# Patient Record
Sex: MALE | Race: WHITE | NOT HISPANIC OR LATINO | Employment: FULL TIME | URBAN - METROPOLITAN AREA
[De-identification: names, ages, dates, MRNs, and addresses within clinical notes are randomized per-mention and may not be internally consistent; named-entity substitution may affect disease eponyms.]

---

## 2023-11-21 ENCOUNTER — TELEPHONE (OUTPATIENT)
Dept: GASTROENTEROLOGY | Facility: CLINIC | Age: 53
End: 2023-11-21
Payer: COMMERCIAL

## 2023-11-21 ENCOUNTER — TRANSCRIBE ORDERS (OUTPATIENT)
Dept: OTHER | Age: 53
End: 2023-11-21

## 2023-11-21 DIAGNOSIS — K86.89 PERIPANCREATIC FLUID COLLECTION: Primary | ICD-10-CM

## 2023-11-21 NOTE — TELEPHONE ENCOUNTER
Advanced Endoscopy     Referring provider: Konrad Tan PA-C   [facility] [place] [address]  Ph: [phone] Fx: [phone]    Referred to: [facility]     Provider Requested: NA     Referral Received: 11/21/23     Records received: Care Everywhere      Images received: PACS    Insurance Coverage: [facility]    Evaluation for: K86.89 (ICD-10-CM) - Peripancreatic fluid collection     Clinical History (per RN review):     Patient hospitalized for a week with necrotizing  pancreatitis in September 2023; followed by GI outpatient for peripancreatic fluid collection. Experienced several weeks of night sweats following hospitalization. Sudden recurrence of pain on 11/19/23 resulted in ED visit. Patient was discharged on narcotic pain medication pending referral here.     Discussed referral with patient on 11/27/23 who reports that he is off pain medication at this time.  Eating and drinking okay. Denies fevers but does report intermittent night sweats. Advised to report to [facility] ED in the event of recurrence of pain or fevers if it's safe for him to do so.     ED visit 11/19/23:  HPI  53-year-old man presents to the emergency department ambulatory for evaluation of recurrent sudden onset epigastric pain. States this came on shortly after he ate some leftover stir newman this morning. Notes the pain is fairly constant but intermittently worsens, seems to occur about every 3 minutes by his best estimation. Radiates slightly to the back. No vomiting but did have some nausea with this. Has had a bowel movement this morning, no blood or melena. No fevers or chills, no urinary symptoms. No prior history of abdominal surgery or known history of ulcers. Does have recent history of pancreatitis in the head and uncinate process, has had interval imaging in his being followed for development of pseudocyst. No abdominal trauma.     EXAM: CT ABD PELVIS  W IV CONT   LOCATION: [facility]   DATE: 11/19/2023   INDICATION: Sudden onset epigastric pain. History of pancreatitis.   COMPARISON: MRCP 10/06/2023.   IMPRESSION:   1.  Mild acute interstitial edematous pancreatitis predominantly involving the pancreatic head. No definite evidence of pancreatic parenchymal necrosis.   2.  Loculated, mildly thick-walled peripancreatic fluid collection centered within the pancreatic head and neck extending into the central mesentery has slightly increased in size since 10/06/2023, now measuring up to 13.2 cm. Superimposed infection is not excluded.   3.  No new peripancreatic fluid collections.   4.  No calcified gallstones or biliary ductal dilation.     EXAM: MR MRCP WO IV CONT   LOCATION: [facility]   DATE: 10/6/2023   INDICATION: Pancreatitis, necrotizing; necrotizing pancreatitis, new bile duct dilation, Acute pancreatitis with uninfected necro, Other specified diseases of biliary tract  IMPRESSION:   1. Enlargement of the peripancreatic fluid collection which results in mild dilatation of the central intrahepatic bile ducts with tapering at the pancreatic head. The collection now measures 12 x 6 x 12 cm. No choledocholithiasis.   2.  The main pancreatic duct is normal in caliber.   3.  No gallstones or visible gallbladder sludge.     GASTROENTEROLOGY CLINIC NOTE 10/3/23  Chief complaint: Necrotizing pancreatitis  HPI: Danny Lofton is a 53 y.o. male with a pertinent past medical history including:HTN, Hyperlipidemia, Acute pancreatitis    He was admitted to [facility] last month for acute pancreatitis. During admission he developed a fever and had imaging showing necrotizing pancreatitis along with possible pneumonia. He was started on meropenem with improvement and switched to Levaquin/Flagyl at discharge and has now completed antibiotics.    He denies any prior history of acute pancreatitis and no history of heavy drinking. He had been started on  lisinopril-HCTZ several months prior to his admission, and that has now been discontinued due to concern that it was the trigger for his pancreatitis. He had an abdominal US showing no cholelithiasis, but a repeat ultrasound did show gallbladder sludge and new CBD dilation. Triglycerides are elevated but not to the degree typically associated with acute pancreatitis.    He continues to have some achy pain in his upper abdomen that is worse with pressure. He has been taking ibuprofen 400mg BID to help - especially at night so that he can get comfortable. Also has some pain on his sides bilaterally. He has been experiencing early satiety since discharge but denies abdominal pain with eating. Has lost about 20lb since he initially presented to the ER, and he notes he has been having drenching night sweats every night now since discharge to the point where he has to go towel off in the bathroom.    He has also been experiencing persistent bloating. He was very constipated during hospitalization, but that has improved and he is now having around 4 bowel movements per day of varied form; states that this is not unusual for him. Denies steatorrhea and no blood in stools. Has never had a colonoscopy but does have a FIT test that was ordered by his PCP he is planning to do.  Recent acute pancreatitis with necrosis s/p antibiotics with improvement in pain, but persistent early satiety, bloating, and discomfort. Recent US with increase in size of pancreatic head fluid collection and new CBD dilation; will order MRCP for further evaluation. Discussed possible ERCP and/or necrosectomy based on results. May also benefit from cholecystectomy in the future due to presence of gallbladder sludge on US. Will also repeat CBC, LFT, and CRP.     EXAM: CT ABD PELVIS W IV CONT   LOCATION: [facility]   DATE: 9/17/2023   INDICATION: Sepsis; fever with pancreatitis.  r/o pancreatic necrosis   IMPRESSION:   1.  Progressive severe  acute interstitial edematous pancreatitis. Interval development of necrosis and an acute necrotic fluid collection within the head and uncinate process. No CT evidence for an infected fluid collection.   2.  Duodenitis and cholangitis of the distal common bile duct secondary to the adjacent pancreatitis.   3.  New trace bilateral pleural effusions with adjacent lower lobar consolidation and airspace opacities suspicious for developing pneumonia, perhaps secondary to aspiration.     [facility]  MD Decision for clinic consultation/Orders:        I will recommend EUS guided cystduodenostomy under fluroscopy for endoscopic drainage. Since he is having night sweats, this should be done urgently. I will recommend 11/29 if possible.         Referral updates/Patient contacted: 11/28/23

## 2023-11-22 ENCOUNTER — DOCUMENTATION ONLY (OUTPATIENT)
Dept: GASTROENTEROLOGY | Facility: CLINIC | Age: 53
End: 2023-11-22
Payer: COMMERCIAL

## 2023-11-22 NOTE — PROGRESS NOTES
Called to request images be pushed to Dr. TATTOFF PACS.    Images Requested:  -- CT Abd Pelvis W IV Cont (11/19/2023 11:37 AM CST)  -- XR Chest 2 Views (10/23/2023 10:32 AM CDT)  -- MR MRCP WO IV Cont (10/06/2023 2:01 PM CDT)  -- US Abd Complete (09/22/2023 1:23 PM CDT)  -- CT Abd Pelvis W IV Cont (09/17/2023 2:39 PM CDT)  -- US Abd RUQ Organs (09/15/2023 9:28 AM CDT)  -- CT Angio Chest Abd Pel W/WO IV Cont Dissection (09/14/2023 6:29 PM CDT)    Facility Information:   Emergency Department   [address] [place], [place] [id number]   Phone #: [phone] sk

## 2023-11-28 ENCOUNTER — PREP FOR PROCEDURE (OUTPATIENT)
Dept: GASTROENTEROLOGY | Facility: CLINIC | Age: 53
End: 2023-11-28
Payer: COMMERCIAL

## 2023-11-28 ENCOUNTER — PATIENT OUTREACH (OUTPATIENT)
Dept: GASTROENTEROLOGY | Facility: CLINIC | Age: 53
End: 2023-11-28
Payer: COMMERCIAL

## 2023-11-28 DIAGNOSIS — K86.89 PERIPANCREATIC FLUID COLLECTION: Primary | ICD-10-CM

## 2023-11-28 NOTE — PROGRESS NOTES
Following review of referral, per Dr. Christianson:     I will recommend EUS guided cystduodenostomy under fluroscopy for endoscopic drainage. Since he is having night sweats, this should be done urgently. I will recommend 11/29 if possible     Please assist in scheduling:     Procedure/Imaging/Clinic: EUS guided cystoduodenostomy under fluoroscopy for endoscopic drainage.  Physician: Sammy  Timing: Next available  Scope time: Provider average  Anesthesia: General  Dx: Peripancreatic fluid collection  Tier:2  Location: [facility]  Patient communication letter header: EUS (Endoscopic Ultrasound)     Offered patient 11/29/23 and scheduled. Unfortunately, he does not have someone available to come with him to [place] on such short notice. Will reschedule for next available date 12/6/23.     Explained they will need a , someone to stay with them for 24 hours and should stay in town for 24 hours (within 45 min of Hospital) post procedure.     Patient will need a pre-op physical within 30 days of procedure. If outside [facility] system, will need physical faxed to number [phone]   If you do not get a preop physical, your procedure could be cancelled, patient voiced understanding*    Preop Plan: Patient will arrange with PCP office. Records available in Care Everywhere.     Does patient have any history of gastric bypass/gastric surgery/altered panc/bili anatomy? No    Does patient have [employer] insurance? No    Med Review    Blood thinner -  None  ASA - None  Diabetic - None   Injectable or oral medications for weight loss - None    Patient Education r/t procedure: Discussion; MyChart activation sent to e-mail. Patient confirmed receipt and will register.     A pre-op nurse will call 1-2 days prior to the procedure.    NPO/Prep: No solid food 8 hours prior to arrival at the hospital. Clear liquids okay until one hour prior to arrival.     Other specific details/comments: None    Advised to contact clinic in  the event of Covid symptoms or known exposure within 14 days of procedure: Discussed.     Verbalized understanding of all instructions. All questions answered. Clinic contact and scheduling numbers verified for future questions/concerns. Message routed to OR scheduling.     Brianda Miller RN, BSN  Care Coordinator  Advanced Endoscopy

## 2023-11-28 NOTE — PROGRESS NOTES
Procedure/Imaging/Clinic: EUS guided cystoduodenostomy under fluoroscopy for endoscopic drainage.  Physician: Sammy  Timing: Next available  Scope time: Provider average  Anesthesia: General  Dx: Peripancreatic fluid collection  Tier:2  Location: [facility]  Patient communication letter header:EUS (Endoscopic Ultrasound)

## 2023-12-04 RX ORDER — ATORVASTATIN CALCIUM 10 MG/1
10 TABLET, FILM COATED ORAL DAILY
COMMUNITY
Start: 2023-01-31 | End: 2024-06-04

## 2023-12-04 RX ORDER — AMLODIPINE BESYLATE 10 MG/1
10 TABLET ORAL DAILY
COMMUNITY
Start: 2023-03-14 | End: 2024-06-04

## 2023-12-06 ENCOUNTER — ANESTHESIA EVENT (OUTPATIENT)
Dept: SURGERY | Facility: CLINIC | Age: 53
End: 2023-12-06
Payer: COMMERCIAL

## 2023-12-06 ENCOUNTER — APPOINTMENT (OUTPATIENT)
Dept: GENERAL RADIOLOGY | Facility: CLINIC | Age: 53
End: 2023-12-06
Attending: INTERNAL MEDICINE
Payer: COMMERCIAL

## 2023-12-06 ENCOUNTER — HOSPITAL ENCOUNTER (OUTPATIENT)
Facility: CLINIC | Age: 53
Discharge: HOME | End: 2023-12-06
Attending: INTERNAL MEDICINE | Admitting: INTERNAL MEDICINE
Payer: COMMERCIAL

## 2023-12-06 ENCOUNTER — ANESTHESIA (OUTPATIENT)
Dept: SURGERY | Facility: CLINIC | Age: 53
End: 2023-12-06
Payer: COMMERCIAL

## 2023-12-06 VITALS
BODY MASS INDEX: 29.56 KG/M2 | DIASTOLIC BLOOD PRESSURE: 102 MMHG | RESPIRATION RATE: 16 BRPM | WEIGHT: 218.26 LBS | TEMPERATURE: 98 F | OXYGEN SATURATION: 95 % | HEIGHT: 72 IN | HEART RATE: 78 BPM | SYSTOLIC BLOOD PRESSURE: 146 MMHG

## 2023-12-06 DIAGNOSIS — K85.92 ACUTE PANCREATITIS WITH INFECTED NECROSIS, UNSPECIFIED PANCREATITIS TYPE: Primary | ICD-10-CM

## 2023-12-06 LAB — UPPER EUS: NORMAL

## 2023-12-06 PROCEDURE — 370N000017 HC ANESTHESIA TECHNICAL FEE, PER MIN: Performed by: INTERNAL MEDICINE

## 2023-12-06 PROCEDURE — 250N000011 HC RX IP 250 OP 636: Mod: JZ | Performed by: ANESTHESIOLOGY

## 2023-12-06 PROCEDURE — C1726 CATH, BAL DIL, NON-VASCULAR: HCPCS | Performed by: INTERNAL MEDICINE

## 2023-12-06 PROCEDURE — 999N000179 XR SURGERY CARM FLUORO LESS THAN 5 MIN W STILLS: Mod: TC

## 2023-12-06 PROCEDURE — 258N000003 HC RX IP 258 OP 636: Performed by: ANESTHESIOLOGY

## 2023-12-06 PROCEDURE — 255N000002 HC RX 255 OP 636: Mod: JZ | Performed by: INTERNAL MEDICINE

## 2023-12-06 PROCEDURE — 999N000141 HC STATISTIC PRE-PROCEDURE NURSING ASSESSMENT: Performed by: INTERNAL MEDICINE

## 2023-12-06 PROCEDURE — 710N000010 HC RECOVERY PHASE 1, LEVEL 2, PER MIN: Performed by: INTERNAL MEDICINE

## 2023-12-06 PROCEDURE — 710N000012 HC RECOVERY PHASE 2, PER MINUTE: Performed by: INTERNAL MEDICINE

## 2023-12-06 PROCEDURE — C1769 GUIDE WIRE: HCPCS | Performed by: INTERNAL MEDICINE

## 2023-12-06 PROCEDURE — 250N000012 HC RX MED GY IP 250 OP 636 PS 637: Performed by: ANESTHESIOLOGY

## 2023-12-06 PROCEDURE — 250N000025 HC SEVOFLURANE, PER MIN: Performed by: INTERNAL MEDICINE

## 2023-12-06 PROCEDURE — 360N000082 HC SURGERY LEVEL 2 W/ FLUORO, PER MIN: Performed by: INTERNAL MEDICINE

## 2023-12-06 PROCEDURE — 272N000001 HC OR GENERAL SUPPLY STERILE: Performed by: INTERNAL MEDICINE

## 2023-12-06 PROCEDURE — C1874 STENT, COATED/COV W/DEL SYS: HCPCS | Performed by: INTERNAL MEDICINE

## 2023-12-06 PROCEDURE — 250N000009 HC RX 250: Performed by: ANESTHESIOLOGY

## 2023-12-06 PROCEDURE — C2625 STENT, NON-COR, TEM W/DEL SY: HCPCS | Performed by: INTERNAL MEDICINE

## 2023-12-06 DEVICE — STENT SOLUS BILIARY 10FRX03CM DBL PIGTAIL W/INTRO G25670
Type: IMPLANTABLE DEVICE | Site: DUODENUM | Status: NON-FUNCTIONAL
Removed: 2023-12-20

## 2023-12-06 DEVICE — STENT AND ELECTROCAUTERY - ENHANCED DELIVERY SYSTEM
Type: IMPLANTABLE DEVICE | Site: DUODENUM | Status: NON-FUNCTIONAL
Brand: AXIOS™
Removed: 2023-12-20

## 2023-12-06 RX ORDER — ONDANSETRON 2 MG/ML
4 INJECTION INTRAMUSCULAR; INTRAVENOUS EVERY 30 MIN PRN
Status: DISCONTINUED | OUTPATIENT
Start: 2023-12-06 | End: 2023-12-06 | Stop reason: HOSPADM

## 2023-12-06 RX ORDER — SODIUM CHLORIDE, SODIUM LACTATE, POTASSIUM CHLORIDE, CALCIUM CHLORIDE 600; 310; 30; 20 MG/100ML; MG/100ML; MG/100ML; MG/100ML
INJECTION, SOLUTION INTRAVENOUS CONTINUOUS PRN
Status: DISCONTINUED | OUTPATIENT
Start: 2023-12-06 | End: 2023-12-06

## 2023-12-06 RX ORDER — SODIUM CHLORIDE, SODIUM LACTATE, POTASSIUM CHLORIDE, CALCIUM CHLORIDE 600; 310; 30; 20 MG/100ML; MG/100ML; MG/100ML; MG/100ML
INJECTION, SOLUTION INTRAVENOUS CONTINUOUS
Status: DISCONTINUED | OUTPATIENT
Start: 2023-12-06 | End: 2023-12-06 | Stop reason: HOSPADM

## 2023-12-06 RX ORDER — HYDROMORPHONE HCL IN WATER/PF 6 MG/30 ML
0.4 PATIENT CONTROLLED ANALGESIA SYRINGE INTRAVENOUS EVERY 5 MIN PRN
Status: DISCONTINUED | OUTPATIENT
Start: 2023-12-06 | End: 2023-12-06 | Stop reason: HOSPADM

## 2023-12-06 RX ORDER — APREPITANT 40 MG/1
40 CAPSULE ORAL ONCE
Status: COMPLETED | OUTPATIENT
Start: 2023-12-06 | End: 2023-12-06

## 2023-12-06 RX ORDER — NALOXONE HYDROCHLORIDE 0.4 MG/ML
0.2 INJECTION, SOLUTION INTRAMUSCULAR; INTRAVENOUS; SUBCUTANEOUS
Status: DISCONTINUED | OUTPATIENT
Start: 2023-12-06 | End: 2023-12-06 | Stop reason: HOSPADM

## 2023-12-06 RX ORDER — FLUMAZENIL 0.1 MG/ML
0.2 INJECTION, SOLUTION INTRAVENOUS
Status: DISCONTINUED | OUTPATIENT
Start: 2023-12-06 | End: 2023-12-06 | Stop reason: HOSPADM

## 2023-12-06 RX ORDER — PROPOFOL 10 MG/ML
INJECTION, EMULSION INTRAVENOUS PRN
Status: DISCONTINUED | OUTPATIENT
Start: 2023-12-06 | End: 2023-12-06

## 2023-12-06 RX ORDER — HYDROMORPHONE HCL IN WATER/PF 6 MG/30 ML
0.2 PATIENT CONTROLLED ANALGESIA SYRINGE INTRAVENOUS EVERY 5 MIN PRN
Status: DISCONTINUED | OUTPATIENT
Start: 2023-12-06 | End: 2023-12-06 | Stop reason: HOSPADM

## 2023-12-06 RX ORDER — LIDOCAINE 40 MG/G
CREAM TOPICAL
Status: DISCONTINUED | OUTPATIENT
Start: 2023-12-06 | End: 2023-12-06 | Stop reason: HOSPADM

## 2023-12-06 RX ORDER — LEVOFLOXACIN 500 MG/1
500 TABLET, FILM COATED ORAL DAILY
Qty: 7 TABLET | Refills: 0 | Status: SHIPPED | OUTPATIENT
Start: 2023-12-06

## 2023-12-06 RX ORDER — IOPAMIDOL 510 MG/ML
INJECTION, SOLUTION INTRAVASCULAR PRN
Status: DISCONTINUED | OUTPATIENT
Start: 2023-12-06 | End: 2023-12-06 | Stop reason: HOSPADM

## 2023-12-06 RX ORDER — FENTANYL CITRATE 50 UG/ML
25 INJECTION, SOLUTION INTRAMUSCULAR; INTRAVENOUS EVERY 5 MIN PRN
Status: DISCONTINUED | OUTPATIENT
Start: 2023-12-06 | End: 2023-12-06 | Stop reason: HOSPADM

## 2023-12-06 RX ORDER — DEXAMETHASONE SODIUM PHOSPHATE 4 MG/ML
INJECTION, SOLUTION INTRA-ARTICULAR; INTRALESIONAL; INTRAMUSCULAR; INTRAVENOUS; SOFT TISSUE PRN
Status: DISCONTINUED | OUTPATIENT
Start: 2023-12-06 | End: 2023-12-06

## 2023-12-06 RX ORDER — ONDANSETRON 2 MG/ML
4 INJECTION INTRAMUSCULAR; INTRAVENOUS EVERY 6 HOURS PRN
Status: DISCONTINUED | OUTPATIENT
Start: 2023-12-06 | End: 2023-12-06 | Stop reason: HOSPADM

## 2023-12-06 RX ORDER — OXYCODONE HYDROCHLORIDE 10 MG/1
10 TABLET ORAL
Status: DISCONTINUED | OUTPATIENT
Start: 2023-12-06 | End: 2023-12-06 | Stop reason: HOSPADM

## 2023-12-06 RX ORDER — ONDANSETRON 4 MG/1
4 TABLET, ORALLY DISINTEGRATING ORAL EVERY 30 MIN PRN
Status: DISCONTINUED | OUTPATIENT
Start: 2023-12-06 | End: 2023-12-06 | Stop reason: HOSPADM

## 2023-12-06 RX ORDER — LEVOFLOXACIN 5 MG/ML
INJECTION, SOLUTION INTRAVENOUS PRN
Status: DISCONTINUED | OUTPATIENT
Start: 2023-12-06 | End: 2023-12-06

## 2023-12-06 RX ORDER — OXYCODONE HYDROCHLORIDE 5 MG/1
5 TABLET ORAL
Status: DISCONTINUED | OUTPATIENT
Start: 2023-12-06 | End: 2023-12-06 | Stop reason: HOSPADM

## 2023-12-06 RX ORDER — FENTANYL CITRATE 50 UG/ML
50 INJECTION, SOLUTION INTRAMUSCULAR; INTRAVENOUS EVERY 5 MIN PRN
Status: DISCONTINUED | OUTPATIENT
Start: 2023-12-06 | End: 2023-12-06 | Stop reason: HOSPADM

## 2023-12-06 RX ORDER — NALOXONE HYDROCHLORIDE 0.4 MG/ML
0.4 INJECTION, SOLUTION INTRAMUSCULAR; INTRAVENOUS; SUBCUTANEOUS
Status: DISCONTINUED | OUTPATIENT
Start: 2023-12-06 | End: 2023-12-06 | Stop reason: HOSPADM

## 2023-12-06 RX ORDER — HYDROCODONE BITARTRATE AND ACETAMINOPHEN 5; 325 MG/1; MG/1
1-2 TABLET ORAL EVERY 6 HOURS PRN
COMMUNITY
Start: 2023-11-19

## 2023-12-06 RX ORDER — LIDOCAINE HYDROCHLORIDE 20 MG/ML
INJECTION, SOLUTION INFILTRATION; PERINEURAL PRN
Status: DISCONTINUED | OUTPATIENT
Start: 2023-12-06 | End: 2023-12-06

## 2023-12-06 RX ORDER — ONDANSETRON 4 MG/1
4 TABLET, ORALLY DISINTEGRATING ORAL EVERY 6 HOURS PRN
Status: DISCONTINUED | OUTPATIENT
Start: 2023-12-06 | End: 2023-12-06 | Stop reason: HOSPADM

## 2023-12-06 RX ORDER — ONDANSETRON 2 MG/ML
INJECTION INTRAMUSCULAR; INTRAVENOUS PRN
Status: DISCONTINUED | OUTPATIENT
Start: 2023-12-06 | End: 2023-12-06

## 2023-12-06 RX ORDER — FENTANYL CITRATE 50 UG/ML
INJECTION, SOLUTION INTRAMUSCULAR; INTRAVENOUS PRN
Status: DISCONTINUED | OUTPATIENT
Start: 2023-12-06 | End: 2023-12-06

## 2023-12-06 RX ADMIN — DEXMEDETOMIDINE HYDROCHLORIDE 8 MCG: 100 INJECTION, SOLUTION INTRAVENOUS at 16:18

## 2023-12-06 RX ADMIN — APREPITANT 40 MG: 40 CAPSULE ORAL at 15:35

## 2023-12-06 RX ADMIN — LIDOCAINE HYDROCHLORIDE 100 MG: 20 INJECTION, SOLUTION INFILTRATION; PERINEURAL at 15:45

## 2023-12-06 RX ADMIN — PROPOFOL 150 MG: 10 INJECTION, EMULSION INTRAVENOUS at 15:45

## 2023-12-06 RX ADMIN — SODIUM CHLORIDE, POTASSIUM CHLORIDE, SODIUM LACTATE AND CALCIUM CHLORIDE: 600; 310; 30; 20 INJECTION, SOLUTION INTRAVENOUS at 15:40

## 2023-12-06 RX ADMIN — ONDANSETRON 4 MG: 2 INJECTION INTRAMUSCULAR; INTRAVENOUS at 16:04

## 2023-12-06 RX ADMIN — PROPOFOL 50 MG: 10 INJECTION, EMULSION INTRAVENOUS at 15:47

## 2023-12-06 RX ADMIN — PHENYLEPHRINE HYDROCHLORIDE 50 MCG: 10 INJECTION INTRAVENOUS at 16:29

## 2023-12-06 RX ADMIN — PHENYLEPHRINE HYDROCHLORIDE 50 MCG: 10 INJECTION INTRAVENOUS at 16:41

## 2023-12-06 RX ADMIN — SUGAMMADEX 200 MG: 100 INJECTION, SOLUTION INTRAVENOUS at 16:52

## 2023-12-06 RX ADMIN — Medication 70 MG: at 15:45

## 2023-12-06 RX ADMIN — FENTANYL CITRATE 50 MCG: 50 INJECTION INTRAMUSCULAR; INTRAVENOUS at 15:45

## 2023-12-06 RX ADMIN — DEXAMETHASONE SODIUM PHOSPHATE 4 MG: 4 INJECTION, SOLUTION INTRA-ARTICULAR; INTRALESIONAL; INTRAMUSCULAR; INTRAVENOUS; SOFT TISSUE at 16:04

## 2023-12-06 RX ADMIN — LEVOFLOXACIN 500 MG: 5 INJECTION, SOLUTION INTRAVENOUS at 15:45

## 2023-12-06 RX ADMIN — FENTANYL CITRATE 50 MCG: 50 INJECTION INTRAMUSCULAR; INTRAVENOUS at 16:19

## 2023-12-06 RX ADMIN — MIDAZOLAM 2 MG: 1 INJECTION INTRAMUSCULAR; INTRAVENOUS at 15:36

## 2023-12-06 ASSESSMENT — ACTIVITIES OF DAILY LIVING (ADL)
ADLS_ACUITY_SCORE: 33
ADLS_ACUITY_SCORE: 35

## 2023-12-06 NOTE — OR NURSING
Dr. Parikh notified that patient has a history of PONV and there aren't any preop medications in for nausea. MD to put in orders.

## 2023-12-06 NOTE — ANESTHESIA PREPROCEDURE EVALUATION
"Anesthesia Pre-Procedure Evaluation    Patient: Danny Lofton   MRN: [id number] : 1970        Procedure : Procedure(s):  ENDOSCOPIC ULTRASOUND guided cystoduodenostomy under fluoroscopy for endoscopic drainage          Past Medical History:   Diagnosis Date     PONV (postoperative nausea and vomiting)       History reviewed. No pertinent surgical history.   No Known Allergies   Social History     Tobacco Use     Smoking status: Former     Types: Cigarettes     Quit date: 1997     Years since quittin.0     Smokeless tobacco: Never   Substance Use Topics     Alcohol use: Not Currently     Comment: none  since september      Wt Readings from Last 1 Encounters:   23 99 kg (218 lb 4.1 oz)        Anesthesia Evaluation   Pt has had prior anesthetic. Type: General.    History of anesthetic complications  - PONV.  emergence delirium.    ROS/MED HX  ENT/Pulmonary:       Neurologic:       Cardiovascular:       METS/Exercise Tolerance: >4 METS    Hematologic:       Musculoskeletal:       GI/Hepatic:    (-) GERD   Renal/Genitourinary:       Endo:       Psychiatric/Substance Use:       Infectious Disease:       Malignancy:       Other:            Physical Exam    Airway        Mallampati: II   TM distance: > 3 FB   Neck ROM: full   Mouth opening: > 3 cm    Respiratory Devices and Support         Dental       (+) Modest Abnormalities - crowns, retainers, 1 or 2 missing teeth      Cardiovascular             Pulmonary               OUTSIDE LABS:  CBC: No results found for: \"WBC\", \"HGB\", \"HCT\", \"PLT\"  BMP: No results found for: \"NA\", \"POTASSIUM\", \"CHLORIDE\", \"CO2\", \"BUN\", \"CR\", \"GLC\"  COAGS: No results found for: \"PTT\", \"INR\", \"FIBR\"  POC: No results found for: \"BGM\", \"HCG\", \"HCGS\"  HEPATIC: No results found for: \"ALBUMIN\", \"PROTTOTAL\", \"ALT\", \"AST\", \"GGT\", \"ALKPHOS\", \"BILITOTAL\", \"BILIDIRECT\", \"DAVID\"  OTHER: No results found for: \"PH\", \"LACT\", \"A1C\", \"RENATA\", \"PHOS\", \"MAG\", \"LIPASE\", \"AMYLASE\", \"TSH\", " "\"T4\", \"T3\", \"CRP\", \"SED\"    Anesthesia Plan    ASA Status:  3    NPO Status:  NPO Appropriate    Anesthesia Type: General.     - Airway: ETT   Induction: Intravenous.   Maintenance: Balanced.        Consents    Anesthesia Plan(s) and associated risks, benefits, and realistic alternatives discussed. Questions answered and patient/representative(s) expressed understanding.     - Discussed:     - Discussed with:  Patient      - Extended Intubation/Ventilatory Support Discussed: No.      - Patient is DNR/DNI Status: No     Use of blood products discussed: No .     Postoperative Care    Pain management: IV analgesics.   PONV prophylaxis: Ondansetron (or other 5HT-3), Dexamethasone or Solumedrol     Comments:               Arlette Parikh MD    I have reviewed the pertinent notes and labs in the chart from the past 30 days and (re)examined the patient.  Any updates or changes from those notes are reflected in this note.              # Overweight: Estimated body mass index is 29.6 kg/m  as calculated from the following:    Height as of this encounter: 1.829 m (6').    Weight as of this encounter: 99 kg (218 lb 4.1 oz).      "

## 2023-12-06 NOTE — ANESTHESIA CARE TRANSFER NOTE
Patient: Danny Lofton    Procedure: Procedure(s):  ENDOSCOPIC ULTRASOUND guided cystoduodenostomy under fluoroscopy for endoscopic drainage. Dilation and stent placement       Diagnosis: Peripancreatic fluid collection [K86.89]  Diagnosis Additional Information: No value filed.    Anesthesia Type:   General     Note:    Oropharynx: oropharynx clear of all foreign objects and spontaneously breathing  Level of Consciousness: awake  Oxygen Supplementation: face mask    Independent Airway: airway patency satisfactory and stable  Dentition: dentition unchanged  Vital Signs Stable: post-procedure vital signs reviewed and stable  Report to RN Given: handoff report given  Patient transferred to: PACU    Handoff Report: Identifed the Patient, Identified the Reponsible Provider, Reviewed the pertinent medical history, Discussed the surgical course, Reviewed Intra-OP anesthesia mangement and issues during anesthesia, Set expectations for post-procedure period and Allowed opportunity for questions and acknowledgement of understanding      Vitals:  Vitals Value Taken Time   /98 12/06/23 1701   Temp     Pulse 61 12/06/23 1706   Resp 7 12/06/23 1706   SpO2 100 % 12/06/23 1706   Vitals shown include unfiled device data.    Electronically Signed By: REJI Love CRNA  December 6, 2023  5:07 PM

## 2023-12-06 NOTE — ANESTHESIA POSTPROCEDURE EVALUATION
Patient: Danny Lofton    Procedure: Procedure(s):  ENDOSCOPIC ULTRASOUND guided cystoduodenostomy under fluoroscopy for endoscopic drainage. Dilation and stent placement       Anesthesia Type:  General    Note:  Disposition: Admission   Postop Pain Control: Uneventful            Sign Out: Well controlled pain   PONV: No   Neuro/Psych: Uneventful            Sign Out: Acceptable/Baseline neuro status   Airway/Respiratory: Uneventful            Sign Out: Acceptable/Baseline resp. status   CV/Hemodynamics: Uneventful            Sign Out: Acceptable CV status; No obvious hypovolemia; No obvious fluid overload   Other NRE: NONE   DID A NON-ROUTINE EVENT OCCUR? No    Event details/Postop Comments:  No complications.           Last vitals:  Vitals Value Taken Time   BP     Temp     Pulse     Resp     SpO2         Electronically Signed By: Shivam Chaudhary MD  December 6, 2023  5:02 PM

## 2023-12-06 NOTE — ANESTHESIA PROCEDURE NOTES
Airway       Patient location during procedure: OR       Procedure Start/Stop Times: 12/6/2023 3:48 PM  Staff -        CRNA: Christina Whalen APRN CRNA       Performed By: CRNA  Consent for Airway        Urgency: elective  Indications and Patient Condition       Indications for airway management: jenny-procedural       Induction type:intravenous       Mask difficulty assessment: 3 - difficult mask (inadequate, unstable, or two providers) +/- NMBA    Final Airway Details       Final airway type: endotracheal airway       Successful airway: ETT - single  Endotracheal Airway Details        ETT size (mm): 7.5       Cuffed: yes       Successful intubation technique: direct laryngoscopy       DL Blade Type: Garibay 2       Grade View of Cords: 1       Adjucts: stylet       Position: Right       Measured from: gums/teeth       Secured at (cm): 23       Bite Block used: GI bite block.    Post intubation assessment        Placement verified by: capnometry, equal breath sounds and chest rise        Number of attempts at approach: 1       Secured with: commercial tube stauffer       Ease of procedure: easy       Dentition: Unchanged    Medication(s) Administered   Medication Administration Time: 12/6/2023 3:48 PM

## 2023-12-07 ENCOUNTER — PREP FOR PROCEDURE (OUTPATIENT)
Dept: GASTROENTEROLOGY | Facility: CLINIC | Age: 53
End: 2023-12-07
Payer: COMMERCIAL

## 2023-12-07 ENCOUNTER — DOCUMENTATION ONLY (OUTPATIENT)
Dept: GASTROENTEROLOGY | Facility: CLINIC | Age: 53
End: 2023-12-07
Payer: COMMERCIAL

## 2023-12-07 ENCOUNTER — PATIENT OUTREACH (OUTPATIENT)
Dept: GASTROENTEROLOGY | Facility: CLINIC | Age: 53
End: 2023-12-07
Payer: COMMERCIAL

## 2023-12-07 DIAGNOSIS — K86.89 PANCREATIC NECROSIS: Primary | ICD-10-CM

## 2023-12-07 NOTE — PROGRESS NOTES
Procedure/Imaging/Clinic: EGD under fluroscopy for LAMS removal and exchange to double pigtailed stent  Physician: Sammy  Timin-10 days  Scope time: Provider average  Anesthesia: General  Dx: Pancreatic necrosis  Tier:2  Location: [facility]  Patient communication letter header: EGD for stent exchange

## 2023-12-07 NOTE — PROGRESS NOTES
"Follow up: Post EUS on 23 with Dr. Christianson.      Post procedure recommendations:   - Observe patient in same day observation unit for ongoing care.   - No ASA or anticoagulation for 3 days   - Will repeat CT scan abdomen with IV contrast to evaluate residual necrosis in 7 days and repeat EGD under fluroscopy in 7-10 days for LAMS removal and exchange to double pigtailed stent   - Will start levaquin 500 mg PO daily for 1 week   - Pt asked to report if he has worsening pain, fevers, chills or night sweats   - The findings and recommendations were discussed with the patient.     Patient states: \"I'm doing okay.\"     Orders placed:   Please assist in scheduling:     Procedure/Imaging/Clinic: EGD under fluoroscopy for LAMS removal and exchange to double pigtailed stent  Physician: Sammy  Timin-10 days  Scope time: Provider average  Anesthesia: General  Dx: Pancreatic necrosis  Tier:2  Location: [facility]  Patient communication letter header: EGD for stent exchange     -CT abdomen with IV contrast to evaluate residual necrosis (due ); patient requests order be sent to [facility] in [place], [place].     Offered patient ; agreeable to schedule.     Pre-op: 23  Anticoagulation: None  Diabetes: None    Reviewed post procedure recommendations and discussed symptoms to report to our clinic. Patient articulated understanding.     Clinic contact and scheduling numbers verified for future questions/concerns.     Brianda Miller RN Care Coordinator    "

## 2023-12-07 NOTE — DISCHARGE INSTRUCTIONS
[facility], [place]  Same-Day Surgery   Adult Discharge Orders & Instructions     What should I do after surgery?  You should rest and relax for the next 24 hours. Avoid risky (hazardous) and difficult (strenuous) activity. A responsible adult caregiver should stay with you overnight, after your surgery.  Don't drive or use any heavy equipment for 24 hours after your surgery. Even if you feel normal, your reactions may be affected by the sleep medicine given to you.  Don't drink alcohol or make any important decisions for 24 hours after surgery.  Slowly get back to your regular diet, as you feel able to do so.  How should I expect to feel?  It's normal to feel dizzy, light-headed, or faint for up to a full day after surgery, or while taking pain medicine. If this happens:   Sit down for a few minutes before standing.  Have someone help you when you get up to walk or use the bathroom.  If you have nausea (feel sick to your stomach) and/or vomit (throw up) after sedation (anesthesia):  Drink clear liquids (such as apple juice, ginger ale, broth, or 7-Up) until you feel better.  If you feel sick to your stomach, or you keep vomiting for 24 hours, please call the doctor.  What else should I know?  You might have a dry mouth, a sore throat, muscle aches, or have trouble sleeping. These issues should go away after 24 hours.  Please contact your doctor if you have any other symptoms that concern you, such as fever, pain, bleeding, fluid drainage, swelling, or headache. Or if it has been over 8 to 10 hours since surgery and you still aren't able to pee (urinate).  If you have a history of sleep apnea, it's extremely important that you use your CPAP machine for the next 24 hours when you nap or sleep.     To contact a doctor, call Dr. Christianson at the Gastroenterology Clinic @ [phone] or:  ' [phone] and ask for the resident on call for Gastroenterology (answered 24  hours a day)  '   Emergency Department:  [facility]: [phone]       (TTY for hearing impaired: [phone])    For informational purposes only. Not to replace the advice of your health care provider. Copyright   2023 [facility]. All rights reserved. Clinically reviewed by Raul West MD. OrdrIt [id number] - 07/23.

## 2023-12-07 NOTE — PROGRESS NOTES
Faxed imaging order per Patient request.     Imaging ordered by Dr. Guru Christianson:  -- CT Abdomen w contrast    Facility Information:   Emergency Department   [address] [place] [address]   Phone #: [phone]  Radiology Fax #: [phone]        SK

## 2023-12-08 NOTE — PROGRESS NOTES
Called [facility] radiology department who denied receipt of faxed order.     Received new fax number. Re-faxed.     Imaging ordered by Dr. Guru Christianson:  -- CT Abdomen w contrast     Facility Information:   Emergency Department   [address] [place] [address]   Phone #: [phone]  Radiology Fax #: [phone]        SK

## 2023-12-15 ENCOUNTER — TELEPHONE (OUTPATIENT)
Dept: GASTROENTEROLOGY | Facility: CLINIC | Age: 53
End: 2023-12-15
Payer: COMMERCIAL

## 2023-12-15 NOTE — CONFIDENTIAL NOTE
Call by patient with fever the past 2-3 hours with fever 100.1.    Patient with necrotizing pancreatitis of unclear etiology complicated by wall-off necrosis and underwent EUS guided cystogastrostomy 12/6/23 with DR. Christianson for fever, abdominal pain. Since the procedure, he was on levofloxacin which he finished for 2 days. Today feels feverish for the past 2-3 hours, no worsening abdominal pain, no fatigue, no cough/SOB/nasal congestion, no sick contact. Has mild headache.    Recent CT 12/13/23: 1.  Again visualized are changes of pancreatitis predominately involving the pancreatic head. There is a loculated thick walled pancreatic fluid collection centered within the pancreatic head and neck and extending into the central mesentery. This has decreased in size since 11/19/2023. Two new stents have been placed since the prior exam.     - Given fever of 2-3 hours, and no worsening abdominal pain, will observe for now. Could also be infected wall off necrosis if the stent is not draining well (atypical given recently got this done), or complication from the procedure, but too early to tell and pt is quite stable now. Patient is advised to closely observe fever and if persistent, he should inform as and will plan for further investigation and possible ED visit to evaluate.   - will also send staff message to inform the outpatient panc bili team    Ryan Siddiqui MD  Gastroenterology/Hepatology Fellow

## 2023-12-18 NOTE — TELEPHONE ENCOUNTER
Contacted patient to follow up on symptoms reported to on-call provider. Fevers, body aches and nausea continue. Taking rojelio seltzer cold and flu medication which has been helpful. Denies any upper respiratory symptoms. Reports fevers have been maintained below 100.0 F. Monitoring diligently. Discussed to report to [facility] in the event of worsening symptoms prior to procedure with Dr. Christianson on 12/20/23.     Update sent to Dr. Christianson.     Brianda Milelr RN Care Coordinator

## 2023-12-19 ENCOUNTER — ANESTHESIA EVENT (OUTPATIENT)
Dept: SURGERY | Facility: CLINIC | Age: 53
End: 2023-12-19
Payer: COMMERCIAL

## 2023-12-20 ENCOUNTER — HOSPITAL ENCOUNTER (OUTPATIENT)
Facility: CLINIC | Age: 53
Discharge: HOME | End: 2023-12-20
Attending: INTERNAL MEDICINE | Admitting: INTERNAL MEDICINE
Payer: COMMERCIAL

## 2023-12-20 ENCOUNTER — APPOINTMENT (OUTPATIENT)
Dept: GENERAL RADIOLOGY | Facility: CLINIC | Age: 53
End: 2023-12-20
Attending: INTERNAL MEDICINE
Payer: COMMERCIAL

## 2023-12-20 ENCOUNTER — ANESTHESIA (OUTPATIENT)
Dept: SURGERY | Facility: CLINIC | Age: 53
End: 2023-12-20
Payer: COMMERCIAL

## 2023-12-20 VITALS
WEIGHT: 214.29 LBS | RESPIRATION RATE: 16 BRPM | OXYGEN SATURATION: 93 % | SYSTOLIC BLOOD PRESSURE: 130 MMHG | HEART RATE: 77 BPM | TEMPERATURE: 98.1 F | BODY MASS INDEX: 29.02 KG/M2 | DIASTOLIC BLOOD PRESSURE: 82 MMHG | HEIGHT: 72 IN

## 2023-12-20 LAB — UPPER GI ENDOSCOPY: NORMAL

## 2023-12-20 PROCEDURE — 710N000012 HC RECOVERY PHASE 2, PER MINUTE: Performed by: INTERNAL MEDICINE

## 2023-12-20 PROCEDURE — 999N000141 HC STATISTIC PRE-PROCEDURE NURSING ASSESSMENT: Performed by: INTERNAL MEDICINE

## 2023-12-20 PROCEDURE — C1726 CATH, BAL DIL, NON-VASCULAR: HCPCS | Performed by: INTERNAL MEDICINE

## 2023-12-20 PROCEDURE — 258N000003 HC RX IP 258 OP 636: Performed by: ANESTHESIOLOGY

## 2023-12-20 PROCEDURE — 250N000009 HC RX 250: Performed by: INTERNAL MEDICINE

## 2023-12-20 PROCEDURE — 999N000181 XR SURGERY CARM FLUORO GREATER THAN 5 MIN W STILLS: Mod: TC

## 2023-12-20 PROCEDURE — 710N000009 HC RECOVERY PHASE 1, LEVEL 1, PER MIN: Performed by: INTERNAL MEDICINE

## 2023-12-20 PROCEDURE — 250N000011 HC RX IP 250 OP 636: Performed by: REGISTERED NURSE

## 2023-12-20 PROCEDURE — 370N000017 HC ANESTHESIA TECHNICAL FEE, PER MIN: Performed by: INTERNAL MEDICINE

## 2023-12-20 PROCEDURE — 250N000009 HC RX 250: Performed by: REGISTERED NURSE

## 2023-12-20 PROCEDURE — 258N000003 HC RX IP 258 OP 636: Performed by: REGISTERED NURSE

## 2023-12-20 PROCEDURE — 360N000082 HC SURGERY LEVEL 2 W/ FLUORO, PER MIN: Performed by: INTERNAL MEDICINE

## 2023-12-20 PROCEDURE — 250N000025 HC SEVOFLURANE, PER MIN: Performed by: INTERNAL MEDICINE

## 2023-12-20 PROCEDURE — 272N000001 HC OR GENERAL SUPPLY STERILE: Performed by: INTERNAL MEDICINE

## 2023-12-20 PROCEDURE — C1769 GUIDE WIRE: HCPCS | Performed by: INTERNAL MEDICINE

## 2023-12-20 PROCEDURE — C2625 STENT, NON-COR, TEM W/DEL SY: HCPCS | Performed by: INTERNAL MEDICINE

## 2023-12-20 PROCEDURE — 255N000002 HC RX 255 OP 636: Mod: JZ | Performed by: INTERNAL MEDICINE

## 2023-12-20 DEVICE — ZIMMON, BILIARY STENT SET
Type: IMPLANTABLE DEVICE | Site: DUODENUM | Status: FUNCTIONAL
Brand: ZIMMON

## 2023-12-20 RX ORDER — OXYCODONE HYDROCHLORIDE 10 MG/1
10 TABLET ORAL
Status: DISCONTINUED | OUTPATIENT
Start: 2023-12-20 | End: 2023-12-20 | Stop reason: HOSPADM

## 2023-12-20 RX ORDER — OXYCODONE HYDROCHLORIDE 5 MG/1
5 TABLET ORAL
Status: DISCONTINUED | OUTPATIENT
Start: 2023-12-20 | End: 2023-12-20 | Stop reason: HOSPADM

## 2023-12-20 RX ORDER — ONDANSETRON 2 MG/ML
4 INJECTION INTRAMUSCULAR; INTRAVENOUS
Status: DISCONTINUED | OUTPATIENT
Start: 2023-12-20 | End: 2023-12-20 | Stop reason: HOSPADM

## 2023-12-20 RX ORDER — LIDOCAINE 40 MG/G
CREAM TOPICAL
Status: DISCONTINUED | OUTPATIENT
Start: 2023-12-20 | End: 2023-12-20 | Stop reason: HOSPADM

## 2023-12-20 RX ORDER — FENTANYL CITRATE 50 UG/ML
50 INJECTION, SOLUTION INTRAMUSCULAR; INTRAVENOUS EVERY 5 MIN PRN
Status: DISCONTINUED | OUTPATIENT
Start: 2023-12-20 | End: 2023-12-20 | Stop reason: HOSPADM

## 2023-12-20 RX ORDER — ONDANSETRON 4 MG/1
4 TABLET, ORALLY DISINTEGRATING ORAL EVERY 30 MIN PRN
Status: DISCONTINUED | OUTPATIENT
Start: 2023-12-20 | End: 2023-12-20 | Stop reason: HOSPADM

## 2023-12-20 RX ORDER — HYDROMORPHONE HYDROCHLORIDE 1 MG/ML
0.2 INJECTION, SOLUTION INTRAMUSCULAR; INTRAVENOUS; SUBCUTANEOUS EVERY 5 MIN PRN
Status: DISCONTINUED | OUTPATIENT
Start: 2023-12-20 | End: 2023-12-20 | Stop reason: HOSPADM

## 2023-12-20 RX ORDER — PROPOFOL 10 MG/ML
INJECTION, EMULSION INTRAVENOUS PRN
Status: DISCONTINUED | OUTPATIENT
Start: 2023-12-20 | End: 2023-12-20

## 2023-12-20 RX ORDER — ONDANSETRON 2 MG/ML
4 INJECTION INTRAMUSCULAR; INTRAVENOUS EVERY 30 MIN PRN
Status: DISCONTINUED | OUTPATIENT
Start: 2023-12-20 | End: 2023-12-20 | Stop reason: HOSPADM

## 2023-12-20 RX ORDER — FENTANYL CITRATE 50 UG/ML
INJECTION, SOLUTION INTRAMUSCULAR; INTRAVENOUS PRN
Status: DISCONTINUED | OUTPATIENT
Start: 2023-12-20 | End: 2023-12-20

## 2023-12-20 RX ORDER — FENTANYL CITRATE 50 UG/ML
25 INJECTION, SOLUTION INTRAMUSCULAR; INTRAVENOUS EVERY 5 MIN PRN
Status: DISCONTINUED | OUTPATIENT
Start: 2023-12-20 | End: 2023-12-20 | Stop reason: HOSPADM

## 2023-12-20 RX ORDER — HYDROMORPHONE HYDROCHLORIDE 1 MG/ML
0.4 INJECTION, SOLUTION INTRAMUSCULAR; INTRAVENOUS; SUBCUTANEOUS EVERY 5 MIN PRN
Status: DISCONTINUED | OUTPATIENT
Start: 2023-12-20 | End: 2023-12-20 | Stop reason: HOSPADM

## 2023-12-20 RX ORDER — ONDANSETRON 2 MG/ML
INJECTION INTRAMUSCULAR; INTRAVENOUS PRN
Status: DISCONTINUED | OUTPATIENT
Start: 2023-12-20 | End: 2023-12-20

## 2023-12-20 RX ORDER — HYDRALAZINE HYDROCHLORIDE 20 MG/ML
2.5-5 INJECTION INTRAMUSCULAR; INTRAVENOUS EVERY 10 MIN PRN
Status: DISCONTINUED | OUTPATIENT
Start: 2023-12-20 | End: 2023-12-20 | Stop reason: HOSPADM

## 2023-12-20 RX ORDER — SODIUM CHLORIDE, SODIUM LACTATE, POTASSIUM CHLORIDE, CALCIUM CHLORIDE 600; 310; 30; 20 MG/100ML; MG/100ML; MG/100ML; MG/100ML
INJECTION, SOLUTION INTRAVENOUS CONTINUOUS PRN
Status: DISCONTINUED | OUTPATIENT
Start: 2023-12-20 | End: 2023-12-20

## 2023-12-20 RX ORDER — IOPAMIDOL 510 MG/ML
INJECTION, SOLUTION INTRAVASCULAR PRN
Status: DISCONTINUED | OUTPATIENT
Start: 2023-12-20 | End: 2023-12-20 | Stop reason: HOSPADM

## 2023-12-20 RX ORDER — DEXAMETHASONE SODIUM PHOSPHATE 4 MG/ML
INJECTION, SOLUTION INTRA-ARTICULAR; INTRALESIONAL; INTRAMUSCULAR; INTRAVENOUS; SOFT TISSUE PRN
Status: DISCONTINUED | OUTPATIENT
Start: 2023-12-20 | End: 2023-12-20

## 2023-12-20 RX ORDER — LIDOCAINE HYDROCHLORIDE 20 MG/ML
INJECTION, SOLUTION INFILTRATION; PERINEURAL PRN
Status: DISCONTINUED | OUTPATIENT
Start: 2023-12-20 | End: 2023-12-20

## 2023-12-20 RX ORDER — LEVOFLOXACIN 5 MG/ML
INJECTION, SOLUTION INTRAVENOUS PRN
Status: DISCONTINUED | OUTPATIENT
Start: 2023-12-20 | End: 2023-12-20

## 2023-12-20 RX ORDER — METOPROLOL TARTRATE 1 MG/ML
1-2 INJECTION, SOLUTION INTRAVENOUS EVERY 5 MIN PRN
Status: DISCONTINUED | OUTPATIENT
Start: 2023-12-20 | End: 2023-12-20 | Stop reason: HOSPADM

## 2023-12-20 RX ORDER — SODIUM CHLORIDE, SODIUM LACTATE, POTASSIUM CHLORIDE, CALCIUM CHLORIDE 600; 310; 30; 20 MG/100ML; MG/100ML; MG/100ML; MG/100ML
INJECTION, SOLUTION INTRAVENOUS CONTINUOUS
Status: DISCONTINUED | OUTPATIENT
Start: 2023-12-20 | End: 2023-12-20 | Stop reason: HOSPADM

## 2023-12-20 RX ADMIN — SODIUM CHLORIDE, POTASSIUM CHLORIDE, SODIUM LACTATE AND CALCIUM CHLORIDE: 600; 310; 30; 20 INJECTION, SOLUTION INTRAVENOUS at 15:00

## 2023-12-20 RX ADMIN — Medication 20 MG: at 14:19

## 2023-12-20 RX ADMIN — SUGAMMADEX 200 MG: 100 INJECTION, SOLUTION INTRAVENOUS at 14:35

## 2023-12-20 RX ADMIN — DEXAMETHASONE SODIUM PHOSPHATE 4 MG: 4 INJECTION, SOLUTION INTRA-ARTICULAR; INTRALESIONAL; INTRAMUSCULAR; INTRAVENOUS; SOFT TISSUE at 12:55

## 2023-12-20 RX ADMIN — LIDOCAINE HYDROCHLORIDE 100 MG: 20 INJECTION, SOLUTION INFILTRATION; PERINEURAL at 12:54

## 2023-12-20 RX ADMIN — LEVOFLOXACIN 500 MG: 5 INJECTION, SOLUTION INTRAVENOUS at 12:54

## 2023-12-20 RX ADMIN — PHENYLEPHRINE HYDROCHLORIDE 100 MCG: 10 INJECTION INTRAVENOUS at 14:08

## 2023-12-20 RX ADMIN — PHENYLEPHRINE HYDROCHLORIDE 100 MCG: 10 INJECTION INTRAVENOUS at 13:56

## 2023-12-20 RX ADMIN — SODIUM CHLORIDE, POTASSIUM CHLORIDE, SODIUM LACTATE AND CALCIUM CHLORIDE: 600; 310; 30; 20 INJECTION, SOLUTION INTRAVENOUS at 12:47

## 2023-12-20 RX ADMIN — ONDANSETRON 4 MG: 2 INJECTION INTRAMUSCULAR; INTRAVENOUS at 14:35

## 2023-12-20 RX ADMIN — Medication 50 MG: at 12:55

## 2023-12-20 RX ADMIN — Medication 20 MG: at 13:42

## 2023-12-20 RX ADMIN — FENTANYL CITRATE 100 MCG: 50 INJECTION INTRAMUSCULAR; INTRAVENOUS at 12:54

## 2023-12-20 RX ADMIN — PROPOFOL 200 MG: 10 INJECTION, EMULSION INTRAVENOUS at 12:54

## 2023-12-20 ASSESSMENT — ACTIVITIES OF DAILY LIVING (ADL)
ADLS_ACUITY_SCORE: 35
ADLS_ACUITY_SCORE: 35
ADLS_ACUITY_SCORE: 33

## 2023-12-20 NOTE — ANESTHESIA CARE TRANSFER NOTE
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    Anesthesia Type:   General     Note:    Oropharynx: oropharynx clear of all foreign objects and spontaneously breathing  Level of Consciousness: drowsy  Oxygen Supplementation: room air    Independent Airway: airway patency satisfactory and stable  Dentition: dentition unchanged  Vital Signs Stable: post-procedure vital signs reviewed and stable  Report to RN Given: handoff report given  Patient transferred to: PACU    Handoff Report: Identifed the Patient, Identified the Reponsible Provider, Reviewed the pertinent medical history, Discussed the surgical course, Reviewed Intra-OP anesthesia mangement and issues during anesthesia, Set expectations for post-procedure period and Allowed opportunity for questions and acknowledgement of understanding      Vitals:  Vitals Value Taken Time   /90 12/20/23 1450   Temp     Pulse 65 12/20/23 1451   Resp     SpO2 92 % 12/20/23 1451   Vitals shown include unfiled device data.    Electronically Signed By: REJI Boles CRNA  December 20, 2023  2:52 PM

## 2023-12-20 NOTE — ANESTHESIA PROCEDURE NOTES
Airway       Patient location during procedure: OR       Procedure Start/Stop Times: 12/20/2023 12:57 PM  Staff -        CRNA: Jose Antonio Yang APRN CRNA       Performed By: CRNA  Consent for Airway        Urgency: elective  Indications and Patient Condition       Indications for airway management: jenny-procedural       Induction type:intravenous       Mask difficulty assessment: 2 - vent by mask + OA or adjuvant +/- NMBA    Final Airway Details       Final airway type: endotracheal airway       Successful airway: ETT - single and Oral  Endotracheal Airway Details        ETT size (mm): 7.5       Cuffed: yes       Successful intubation technique: direct laryngoscopy       DL Blade Type: MAC 4       Grade View of Cords: 1       Adjucts: stylet       Position: Right       Measured from: lips       Secured at (cm): 23       Bite block used: None    Post intubation assessment        Placement verified by: capnometry and equal breath sounds        Number of attempts at approach: 1       Number of other approaches attempted: 0       Secured with: tape       Ease of procedure: easy       Dentition: Intact and Unchanged    Medication(s) Administered   Medication Administration Time: 12/20/2023 12:57 PM

## 2023-12-20 NOTE — ANESTHESIA POSTPROCEDURE EVALUATION
Patient: Danny Lofton    Procedure: Procedure(s):  ESOPHAGOGASTRODUODENOSCOPY with cystduodenostomy stent exchange, necrosectomy       Anesthesia Type:  General    Note:  Disposition: Outpatient   Postop Pain Control: Uneventful            Sign Out: Well controlled pain   PONV: No   Neuro/Psych: Uneventful            Sign Out: Acceptable/Baseline neuro status   Airway/Respiratory: Uneventful            Sign Out: Acceptable/Baseline resp. status   CV/Hemodynamics: Uneventful            Sign Out: Acceptable CV status; No obvious hypovolemia; No obvious fluid overload   Other NRE: NONE   DID A NON-ROUTINE EVENT OCCUR? No           Last vitals:  Vitals Value Taken Time   /89 12/20/23 1500   Temp 36.5  C (97.7  F) 12/20/23 1453   Pulse 71 12/20/23 1503   Resp 18 12/20/23 1453   SpO2 92 % 12/20/23 1503   Vitals shown include unfiled device data.    Electronically Signed By: Willard Lombardi MD  December 20, 2023  3:04 PM

## 2023-12-20 NOTE — ANESTHESIA PREPROCEDURE EVALUATION
Anesthesia Pre-Procedure Evaluation    Patient: Danny Lofton   MRN: [id number] : 1970        Procedure : Procedure(s):  ESOPHAGOGASTRODUODENOSCOPY with stent exchange          Past Medical History:   Diagnosis Date    PONV (postoperative nausea and vomiting)       Past Surgical History:   Procedure Laterality Date    ENDOSCOPIC ULTRASOUND UPPER GASTROINTESTINAL TRACT (GI) N/A 2023    Procedure: ENDOSCOPIC ULTRASOUND guided cystoduodenostomy under fluoroscopy for endoscopic drainage. Dilation and stent placement;  Surgeon: Guru Edwige Christianson MD;  Location: [facility] OR      No Known Allergies   Social History     Tobacco Use    Smoking status: Former     Types: Cigarettes     Quit date: 1997     Years since quittin.0    Smokeless tobacco: Never   Substance Use Topics    Alcohol use: Not Currently     Comment: none  since september      Wt Readings from Last 1 Encounters:   23 97.2 kg (214 lb 4.6 oz)        Anesthesia Evaluation   Pt has had prior anesthetic. Type: General.    History of anesthetic complications  - PONV.      ROS/MED HX  ENT/Pulmonary:  - neg pulmonary ROS     Neurologic:  - neg neurologic ROS     Cardiovascular:       METS/Exercise Tolerance: 4 - Raking leaves, gardening    Hematologic: Comments: No lab results found.   No lab results found.        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   (-) GERD   Renal/Genitourinary:  - neg Renal ROS     Endo:  - neg endo ROS     Psychiatric/Substance Use:  - neg psychiatric ROS     Infectious Disease:  - neg infectious disease ROS     Malignancy:  - neg malignancy ROS     Other:            Physical Exam    Airway        Mallampati: II   TM distance: > 3 FB   Neck ROM: full   Mouth opening: > 3 cm    Respiratory Devices and Support         Dental       (+) Modest Abnormalities - crowns, retainers, 1 or 2 missing  "teeth      Cardiovascular   cardiovascular exam normal          Pulmonary   pulmonary exam normal                OUTSIDE LABS:  CBC: No results found for: \"WBC\", \"HGB\", \"HCT\", \"PLT\"  BMP: No results found for: \"NA\", \"POTASSIUM\", \"CHLORIDE\", \"CO2\", \"BUN\", \"CR\", \"GLC\"  COAGS: No results found for: \"PTT\", \"INR\", \"FIBR\"  POC: No results found for: \"BGM\", \"HCG\", \"HCGS\"  HEPATIC: No results found for: \"ALBUMIN\", \"PROTTOTAL\", \"ALT\", \"AST\", \"GGT\", \"ALKPHOS\", \"BILITOTAL\", \"BILIDIRECT\", \"DAVID\"  OTHER: No results found for: \"PH\", \"LACT\", \"A1C\", \"RENATA\", \"PHOS\", \"MAG\", \"LIPASE\", \"AMYLASE\", \"TSH\", \"T4\", \"T3\", \"CRP\", \"SED\"    Anesthesia Plan    ASA Status:  3       Anesthesia Type: General.     - Airway: ETT   Induction: Intravenous, Propofol.   Maintenance: Balanced.        Consents    Anesthesia Plan(s) and associated risks, benefits, and realistic alternatives discussed. Questions answered and patient/representative(s) expressed understanding.     - Discussed:     - Discussed with:  Patient      - Extended Intubation/Ventilatory Support Discussed: No.      - Patient is DNR/DNI Status: No     Use of blood products discussed: No .     Postoperative Care    Pain management: IV analgesics.   PONV prophylaxis: Dexamethasone or Solumedrol, Ondansetron (or other 5HT-3)     Comments:               Willard Lombardi MD    I have reviewed the pertinent notes and labs in the chart from the past 30 days and (re)examined the patient.  Any updates or changes from those notes are reflected in this note.              # Overweight: Estimated body mass index is 29.06 kg/m  as calculated from the following:    Height as of this encounter: 1.829 m (6').    Weight as of this encounter: 97.2 kg (214 lb 4.6 oz).      "

## 2023-12-20 NOTE — DISCHARGE INSTRUCTIONS
[facility], [place]  Same-Day Surgery   Adult Discharge Orders & Instructions     What should I do after surgery?  You should rest and relax for the next 24 hours. Avoid risky (hazardous) and difficult (strenuous) activity. A responsible adult caregiver should stay with you overnight, after your surgery.  Don't drive or use any heavy equipment for 24 hours after your surgery. Even if you feel normal, your reactions may be affected by the sleep medicine given to you.  Don't drink alcohol or make any important decisions for 24 hours after surgery.  Slowly get back to your regular diet, as you feel able to do so.  How should I expect to feel?  It's normal to feel dizzy, light-headed, or faint for up to a full day after surgery, or while taking pain medicine. If this happens:   Sit down for a few minutes before standing.  Have someone help you when you get up to walk or use the bathroom.  If you have nausea (feel sick to your stomach) and/or vomit (throw up) after sedation (anesthesia):  Drink clear liquids (such as apple juice, ginger ale, broth, or 7-Up) until you feel better.  If you feel sick to your stomach, or you keep vomiting for 24 hours, please call the doctor.  What else should I know?  You might have a dry mouth, a sore throat, muscle aches, or have trouble sleeping. These issues should go away after 24 hours.  Please contact your doctor if you have any other symptoms that concern you, such as fever, pain, bleeding, fluid drainage, swelling, or headache. Or if it has been over 8 to 10 hours since surgery and you still aren't able to pee (urinate).  If you have a history of sleep apnea, it's extremely important that you use your CPAP machine for the next 24 hours when you nap or sleep.     To contact a doctor, call Dr. Christianson at the [facility] @ [phone] or:  '   [phone] and ask for the resident on call for Gastroenterology (answered 24 hours a day)  '    Emergency Department:  [facility]: [phone]       (TTY for hearing impaired: [phone])  [facility]: [phone]       (TTY for hearing impaired: [phone])    For informational purposes only. Not to replace the advice of your health care provider. Copyright   2023 [facility]. All rights reserved. Clinically reviewed by Raul West MD. Zoe Majeste [id number] - 07/23.

## 2023-12-28 ENCOUNTER — DOCUMENTATION ONLY (OUTPATIENT)
Dept: GASTROENTEROLOGY | Facility: CLINIC | Age: 53
End: 2023-12-28
Payer: COMMERCIAL

## 2023-12-28 ENCOUNTER — PATIENT OUTREACH (OUTPATIENT)
Dept: GASTROENTEROLOGY | Facility: CLINIC | Age: 53
End: 2023-12-28
Payer: COMMERCIAL

## 2023-12-28 DIAGNOSIS — K86.89 PANCREATIC NECROSIS: Primary | ICD-10-CM

## 2023-12-28 NOTE — PROGRESS NOTES
"Follow up: Post ERCP on 12/20/23 with Dr. Christianson.      Post procedure recommendations:   - Observe patient in same day observation unit for ongoing care.   - Likely will not require further endoscopic necrosectomy. Will recommend CECT in 4-6 weeks followed by Pancreas Clinic Visit. May consider a secretin stimulated MRCP for evaluating disconnected pancreatic duct.  - Pt to watch for signs of infection such as fevers, chills and night sweats   - Will consider removing the cystduodenostomy stents if there is no sign of disconnected pancreatic duct   - The findings and recommendations were discussed with the patient.     Patient states: \"I'm actually doing very good.\" Intermittent pain in abdomen, but improving     Orders placed:   -CT abdomen due approximately 2/1/24; patient requests that this order be sent to [facility] in [place], [place].   -Offer 2/8/24 @ 12:00 PM clinic with Dr. Christianson. In person.     Reviewed post procedure recommendations and discussed symptoms to report to our clinic. Patient articulated understanding.     Clinic contact and scheduling numbers verified for future questions/concerns.     Brianda Miller RN Care Coordinator    "

## 2023-12-28 NOTE — PROGRESS NOTES
Faxed imaging order per Patient request.      Imaging ordered by Dr. Guru Christianson:  -- CT Abdomen w contrast     Facility Information:   Emergency Department   [address] [place] [address]   Phone #: [phone]  Radiology Fax #: [phone]        SK

## 2023-12-31 ENCOUNTER — HEALTH MAINTENANCE LETTER (OUTPATIENT)
Age: 53
End: 2023-12-31

## 2024-02-02 ENCOUNTER — DOCUMENTATION ONLY (OUTPATIENT)
Dept: GASTROENTEROLOGY | Facility: CLINIC | Age: 54
End: 2024-02-02
Payer: COMMERCIAL

## 2024-02-02 NOTE — PROGRESS NOTES
Called to request images be pushed to [facility].    Images Requested:  -- CT Abd W/WO IV Cont Pancreas (02/02/2024 8:20 AM CST)    Facility Information:  [facility] CT Scan   [address] [place] [id number]   Phone #: [phone] sk

## 2024-02-08 ENCOUNTER — OFFICE VISIT (OUTPATIENT)
Dept: GASTROENTEROLOGY | Facility: CLINIC | Age: 54
End: 2024-02-08
Payer: COMMERCIAL

## 2024-02-08 VITALS
SYSTOLIC BLOOD PRESSURE: 132 MMHG | HEIGHT: 72 IN | WEIGHT: 215 LBS | OXYGEN SATURATION: 96 % | DIASTOLIC BLOOD PRESSURE: 99 MMHG | HEART RATE: 65 BPM | BODY MASS INDEX: 29.12 KG/M2

## 2024-02-08 DIAGNOSIS — K86.89 PANCREATIC NECROSIS: Primary | ICD-10-CM

## 2024-02-08 PROCEDURE — 99205 OFFICE O/P NEW HI 60 MIN: CPT | Performed by: INTERNAL MEDICINE

## 2024-02-08 ASSESSMENT — PAIN SCALES - GENERAL: PAINLEVEL: NO PAIN (0)

## 2024-02-08 NOTE — LETTER
2/8/2024         RE: Danny Lofton  [address]  [facility] [address]        Dear Colleague,    Thank you for referring your patient, Danny Lofton, to the [facility]. Please see a copy of my visit note below.    PANCREAS/INTERVENTIONAL ENDOSCOPY OUTPATIENT CLINIC CONSULT  DATE OF SERVICE: 2/8/2024  PROVIDER REQUESTING CONSULT: No ref. provider found  Reason for Consultation: Konrad Tan PA-C      ASSESSMENT:  53-year-old white male with history of hypertension hyperlipidemia who is admitted in late September with idiopathic acute pancreatitis which was complicated by a large periduodenal walled off collection extending to the central mesentery which was compressing the bile duct as well as causing abdominal pain distention fevers chills and night sweats s/p endoscopic transluminal drainage followed by 1 session of endoscopic necrosectomy.  Currently with 2 7 Belgian by 2 cm double pigtail Zinman stents no residual collection on repeat CT.    RECOMMENDATIONS:    1.  I reviewed the most recent CT scan with IV contrast performed earlier this month with the patient.  CT scan shows no new collections.  Very small residual collection.  Previously placed cyst duodenostomy stents are still present.  Discussed with the patient that we will leave the cyst duodenostomy stents indefinitely.  2.  Will recommend checking HbA1c twice a year.  Patient is at increased risk for type IIIc diabetes following his episode of necrotizing pancreatitis.  3.  Will recommend patient to schedule a screening colonoscopy with me.  This will be done under MAC sedation and U PU  4.  Patient has been recommended to abstain from alcohol and smoking as he is currently doing  5.  Discussed with the patient that the cause for his acute pancreatitis episode is still unknown he likely has idiopathic etiology.  Will recommend patient to for a more detailed evaluation including  genetic test if he has another episode of pancreatitis.  GLP-1 agonist for diabetes or obesity should be avoided  6.  No further clinic or imaging follow-up is needed    Thank you for this consultation.  It was a pleasure to participate in the care of this patient; please contact us with any further questions.      60 minutes spent on the date of the encounter doing chart review, review of outside records, review of test results, interpretation of tests, patient visit, documentation and discussion with other provider(s)      Guru Sammy MD, CAROL  Associate Professor of Medicine  [facility]  Division of Gastroenterology and Hepatology  [place] [address]    ________________________________________________________________  HPI:  53-year-old white male with past medical history significant for hypertension hyperlipidemia was admitted for acute necrotizing pancreatitis in September 2023.  Etiology of acute pancreatitis was unknown.  He was never known to be a heavy drinker.  His triglycerides were elevated at 361.  During admission his liver function test was completely normal.  His ultrasound in September 15, 2023 did not show any evidence of gallbladder stones or gallbladder thickening.  He was started on lisinopril hydrochlorothiazide several months ago which was stopped after the episode of acute pancreatitis.  He presented with an acute necrotic collection in November 2023 which progressed to a walled off necrosis.  Which measured 13.2 x 7.6 x 12.4 cm along the pancreaticoduodenal groove surrounding the descending duodenum and along the right anterior pararenal fascial groove extending into the central mesenteric.  He underwent EUS under fluoroscopy for endoscopic transluminal drainage in the setting of fever chills night sweats early satiety abdominal pain and bloating.  During this procedure we placed a 15 mm Axios stent with a 10  Hungarian by 3 cm coaxial Solus double-pigtail stent.  He had a repeat CT scan which showed significant improvement in collection.  He underwent endoscopic transluminal necrosectomy on December 13, 2023 following which direct endoscopic necrosectomy was performed.  To 7 Hungarian by 2 cm double pigtail Zinman's stents were placed due to concerns for possible disconnected pancreatic duct.  Since procedure patient has been doing extremely well.  His most recent CT scan with abdominal contrast performed earlier this month shows no residual necrotic collection.  Previously placed cystoduodenostomy stents are still in situ.  Patient denies jaundice, abdominal distension, lower extremity edema, lethargy or confusion.    No history of melena, hematemesis or hematochezia.    Patient denies fevers, sweats, chills or weight loss.    PMHx:  Past Medical History:   Diagnosis Date    PONV (postoperative nausea and vomiting)      There is no problem list on file for this patient.      PSurgHx:  Past Surgical History:   Procedure Laterality Date    ENDOSCOPIC ULTRASOUND UPPER GASTROINTESTINAL TRACT (GI) N/A 12/6/2023    Procedure: ENDOSCOPIC ULTRASOUND guided cystoduodenostomy under fluoroscopy for endoscopic drainage. Dilation and stent placement;  Surgeon: Guru Edwige Christianson MD;  Location: [facility] OR    ESOPHAGOSCOPY, GASTROSCOPY, DUODENOSCOPY (EGD), COMBINED N/A 12/20/2023    Procedure: ESOPHAGOGASTRODUODENOSCOPY with cystduodenostomy stent exchange, necrosectomy;  Surgeon: Guru Edwige Christianson MD;  Location: [facility] OR       MEDS:  Current Outpatient Medications   Medication    amLODIPine (NORVASC) 10 MG tablet    atorvastatin (LIPITOR) 10 MG tablet    HYDROcodone-acetaminophen (NORCO) 5-325 MG tablet    levofloxacin (LEVAQUIN) 500 MG tablet     No current facility-administered medications for this visit.     ALLERGIES:  No Known Allergies  FHx:No family history on file.  Patient is adopted.  No family  history known.  He has a biological son who is healthy.  SOCIAL Hx:  He is a federal investigator for fatal crashes.  He is currently .  History of smoking during .  No history of active alcohol abuse  Social History     Socioeconomic History    Marital status:      Spouse name: Not on file    Number of children: Not on file    Years of education: Not on file    Highest education level: Not on file   Occupational History    Not on file   Tobacco Use    Smoking status: Former     Types: Cigarettes     Quit date: 1997     Years since quittin.1    Smokeless tobacco: Never   Substance and Sexual Activity    Alcohol use: Not Currently     Comment: none  since september    Drug use: Not Currently    Sexual activity: Not on file   Other Topics Concern    Not on file   Social History Narrative    Not on file     Social Determinants of Health     Financial Resource Strain: Not on file   Food Insecurity: Not on file   Transportation Needs: Not on file   Physical Activity: Not on file   Stress: Not on file   Social Connections: Not on file   Interpersonal Safety: Not on file   Housing Stability: Not on file       ROS: A comprehensive Review of Systems was asked and answered in the negative unless specifically commented upon in the HPI    Physical Exam  BP (!) 132/99   Pulse 65   Ht 1.829 m (6')   Wt 97.5 kg (215 lb)   SpO2 96%   BMI 29.16 kg/m    Body mass index is 29.16 kg/m .  Gen: A&Ox3, NAD  HEENT: Moist mucus membranes, no scleral icterus.  Lungs: no respiratory distress  Abd: soft, non-tender, non-distended.  No guarding/rigidity/rebound.  Skin: no jaundice, no stigmata of chronic liver disease  Ext: warm, dry, no evidence of edema    LABS:  Admission on 2023, Discharged on 2023   Component Date Value Ref Range Status    Upper GI Endoscopy 2023    Final                    Value:[facility]  [address] [place] [address]  [phone]     Endoscopy Department  _______________________________________________________________________________  Patient Name: Danny Lofton        Procedure Date: 12/20/2023 1:14 PM  MRN: [id number]                       Account Number: [id number]  YOB: 1970               Admit Type: Outpatient  Age: 53                               Room:  OR   Gender: Male                          Note Status: Finalized  Attending MD: GURU KRISTOFER SALINAS , ,   Total Sedation Time:   _______________________________________________________________________________     Procedure:             Upper GI endoscopy  Indications:           Walled off necrosis post endoscopic transluminal                          drainage                         53 y.o. male with a pertinent past medical history                          including HTN, Hyperlipidemia,acute pancreatitis                                                    unknown etiology (not alcohol) complicated by walled                         off necrosis. EUS under fluroscopy for endoscopic                          transluminal drainage was performed on 12/6. EGD under                          fluroscopy for cystdu  Providers:             GURU KRISTOFER SALINAS, Thaddeus Grant,                          LARRY  Referring MD:          DAISY STAPLETON  Medicines:             General Anesthesia, Levaquin 500 mg IV  Complications:         No immediate complications.  _______________________________________________________________________________  Procedure:             Pre-Anesthesia Assessment:                         - Prior to the procedure, a History and Physical was                          performed, and patient medications and allergies were                          reviewed. The patient is competent. The risks and                          benefits of the procedure and the sedation options and                          risks were                            discussed with the patient. All questions                          were answered and informed consent was obtained.                          Patient identification and proposed procedure were                          verified by the physician and the nurse in the                          pre-procedure area. Mental Status Examination: alert                          and oriented. Airway Examination: normal oropharyngeal                          airway and neck mobility. Respiratory Examination:                          clear to auscultation. CV Examination: normal.                          Prophylactic Antibiotics: The patient requires                          prophylactic antibiotics. Prior Anticoagulants: The                          patient has taken no anticoagulant or antiplatelet                          agents. ASA Grade Assessment: III - A patient with                          severe systemic disease. After reviewing the risks and                          benefits, the patient wa                          s deemed in satisfactory                          condition to undergo the procedure. The anesthesia                          plan was to use general anesthesia. Immediately prior                          to administration of medications, the patient was                          re-assessed for adequacy to receive sedatives. The                          heart rate, respiratory rate, oxygen saturations,                          blood pressure, adequacy of pulmonary ventilation, and                          response to care were monitored throughout the                          procedure. The physical status of the patient was                          re-assessed after the procedure.                         After obtaining informed consent, the endoscope was                          passed under direct vision. Throughout the procedure,                          the patient's blood pressure,  pulse, and oxygen                          saturations were monitored continuously. The [id number]                                                    EGD was introduced through the mouth, and advanced to                          the second part of duodenum.                                                                                   Findings:       The examined esophagus was normal.       Extrinsic compression on the stomach made it a J-shaped stomach. The 1 T        single channel therapeutic gastroscope was exchanged to a 180 QF scope.       Two previously placed cystduodenostomy LAMS and coaxial double pigtailed        stents were seen in the second portion of the duodenum. STent appeared        to be occluded with large amount of necrosum which was debrided using a        rat-toothed forceps and stone extraction ballloon. Stent removal was        accomplished with a rat-toothed forceps. Contrast was injected to        delineate the necrotic cavity. Small residual cavity with minimal solid        material was seen.A 0.025 x 450 cm Visiglide wire was coiled in the        necrotic cavity. Two 7 Fr by 2 cm                           double pigtailed Zimmon stents were        left within the necrotic cavity                                                                                   Impression:            - Previously placed cystduodenostomy stents could be                          accessed for intervention only using the 180 QF scope                         - Occluded cystduodenostomy stents and endoscopic                          transluminal necrosectomy was performed in a                          methodical manner                         - Two 7 Fr by 2 cm double pigtailed Zimmon stents were                          left insitu due to concerns for disconnected                          pancreatic duct                         - Prcoedure was extremely complex and advanced                          (MODiFIER  "22)  Recommendation:        - Observe patient in same day observation unit for                          ongoing care.                         - Likely will not require further endoscopic                                                    necrosectomy.Will recommend CECT in 4-6 weeks followed                          by Pancreas Clinic Visit. May consider a secretin                          stimulated MRCP for evaluating disconnected pancreatic                          duct                         - Pt to watch for signs of infection such as fevers,                          chills and night sweats                         - Will consider removing the cystduodenostomy stents                          if there is no sign of disconnected pancreatic duct                         - The findings and recommendations were discussed with                          the patient.                                                                                     ____________________________________  GURU KRISTOFER SALINAS,   12/20/2023 2:59:33 PM  Number of Addenda: 0    Note Initiated On: 12/20/2023 1:14 PM  Scope In:  Scope Out:            No results found for: \"NA\" No results found for: \"CHLORIDE\" No results found for: \"BUN\"   No results found for: \"POTASSIUM\" No results found for: \"CO2\" No results found for: \"CR\"     No results found for: \"WBC\", \"HGB\", \"HCT\", \"MCV\", \"PLT\"      IMAGING:  No results found for this or any previous visit.        Endoscopies:  No results found for this or any previous visit.           Again, thank you for allowing me to participate in the care of your patient.      Sincerely,    Guru Sammy MD  "

## 2024-02-08 NOTE — NURSING NOTE
Chief Complaint   Patient presents with    New Patient       Vitals:    02/08/24 0916   BP: (!) 132/99   Pulse: 65   SpO2: 96%   Weight: 97.5 kg (215 lb)   Height: 1.829 m (6')       Body mass index is 29.16 kg/m .    Blood pressure elevated. Provider notified. Recheck offered.     Ching Ann

## 2024-02-08 NOTE — PROGRESS NOTES
PANCREAS/INTERVENTIONAL ENDOSCOPY OUTPATIENT CLINIC CONSULT  DATE OF SERVICE: 2/8/2024  PROVIDER REQUESTING CONSULT: No ref. provider found  Reason for Consultation: Konrad Tan PA-C      ASSESSMENT:  53-year-old white male with history of hypertension hyperlipidemia who is admitted in late September with idiopathic acute pancreatitis which was complicated by a large periduodenal walled off collection extending to the central mesentery which was compressing the bile duct as well as causing abdominal pain distention fevers chills and night sweats s/p endoscopic transluminal drainage followed by 1 session of endoscopic necrosectomy.  Currently with 2 7 Costa Rican by 2 cm double pigtail Zinman stents no residual collection on repeat CT.    RECOMMENDATIONS:    1.  I reviewed the most recent CT scan with IV contrast performed earlier this month with the patient.  CT scan shows no new collections.  Very small residual collection.  Previously placed cyst duodenostomy stents are still present.  Discussed with the patient that we will leave the cyst duodenostomy stents indefinitely.  2.  Will recommend checking HbA1c twice a year.  Patient is at increased risk for type IIIc diabetes following his episode of necrotizing pancreatitis.  3.  Will recommend patient to schedule a screening colonoscopy with me.  This will be done under MAC sedation and U PU  4.  Patient has been recommended to abstain from alcohol and smoking as he is currently doing  5.  Discussed with the patient that the cause for his acute pancreatitis episode is still unknown he likely has idiopathic etiology.  Will recommend patient to for a more detailed evaluation including genetic test if he has another episode of pancreatitis.  GLP-1 agonist for diabetes or obesity should be avoided  6.  No further clinic or imaging follow-up is needed    Thank you for this consultation.  It was a pleasure to participate in the care of this patient; please contact us  with any further questions.      60 minutes spent on the date of the encounter doing chart review, review of outside records, review of test results, interpretation of tests, patient visit, documentation and discussion with other provider(s)      Guru Sammy MD, CAROL  Associate Professor of Medicine  [facility]  Division of Gastroenterology and Hepatology  [facility] [address], [place] [address]    ________________________________________________________________  HPI:  53-year-old white male with past medical history significant for hypertension hyperlipidemia was admitted for acute necrotizing pancreatitis in September 2023.  Etiology of acute pancreatitis was unknown.  He was never known to be a heavy drinker.  His triglycerides were elevated at 361.  During admission his liver function test was completely normal.  His ultrasound in September 15, 2023 did not show any evidence of gallbladder stones or gallbladder thickening.  He was started on lisinopril hydrochlorothiazide several months ago which was stopped after the episode of acute pancreatitis.  He presented with an acute necrotic collection in November 2023 which progressed to a walled off necrosis.  Which measured 13.2 x 7.6 x 12.4 cm along the pancreaticoduodenal groove surrounding the descending duodenum and along the right anterior pararenal fascial groove extending into the central mesenteric.  He underwent EUS under fluoroscopy for endoscopic transluminal drainage in the setting of fever chills night sweats early satiety abdominal pain and bloating.  During this procedure we placed a 15 mm Axios stent with a 10 Japanese by 3 cm coaxial Solus double-pigtail stent.  He had a repeat CT scan which showed significant improvement in collection.  He underwent endoscopic transluminal necrosectomy on December 13, 2023 following which direct endoscopic necrosectomy was performed.  To 7 Japanese by 2 cm  double pigtail Zinman's stents were placed due to concerns for possible disconnected pancreatic duct.  Since procedure patient has been doing extremely well.  His most recent CT scan with abdominal contrast performed earlier this month shows no residual necrotic collection.  Previously placed cystoduodenostomy stents are still in situ.  Patient denies jaundice, abdominal distension, lower extremity edema, lethargy or confusion.    No history of melena, hematemesis or hematochezia.    Patient denies fevers, sweats, chills or weight loss.    PMHx:  Past Medical History:   Diagnosis Date    PONV (postoperative nausea and vomiting)      There is no problem list on file for this patient.      PSurgHx:  Past Surgical History:   Procedure Laterality Date    ENDOSCOPIC ULTRASOUND UPPER GASTROINTESTINAL TRACT (GI) N/A 12/6/2023    Procedure: ENDOSCOPIC ULTRASOUND guided cystoduodenostomy under fluoroscopy for endoscopic drainage. Dilation and stent placement;  Surgeon: Guru Edwige Christianson MD;  Location: [facility] OR    ESOPHAGOSCOPY, GASTROSCOPY, DUODENOSCOPY (EGD), COMBINED N/A 12/20/2023    Procedure: ESOPHAGOGASTRODUODENOSCOPY with cystduodenostomy stent exchange, necrosectomy;  Surgeon: Guru Edwige Christianson MD;  Location: [facility] OR       MEDS:  Current Outpatient Medications   Medication    amLODIPine (NORVASC) 10 MG tablet    atorvastatin (LIPITOR) 10 MG tablet    HYDROcodone-acetaminophen (NORCO) 5-325 MG tablet    levofloxacin (LEVAQUIN) 500 MG tablet     No current facility-administered medications for this visit.     ALLERGIES:  No Known Allergies  FHx:No family history on file.  Patient is adopted.  No family history known.  He has a biological son who is healthy.  SOCIAL Hx:  He is a federal investigator for fatal crashes.  He is currently .  History of smoking during .  No history of active alcohol abuse  Social History     Socioeconomic History    Marital status:       Spouse name: Not on file    Number of children: Not on file    Years of education: Not on file    Highest education level: Not on file   Occupational History    Not on file   Tobacco Use    Smoking status: Former     Types: Cigarettes     Quit date: 1997     Years since quittin.1    Smokeless tobacco: Never   Substance and Sexual Activity    Alcohol use: Not Currently     Comment: none  since september    Drug use: Not Currently    Sexual activity: Not on file   Other Topics Concern    Not on file   Social History Narrative    Not on file     Social Determinants of Health     Financial Resource Strain: Not on file   Food Insecurity: Not on file   Transportation Needs: Not on file   Physical Activity: Not on file   Stress: Not on file   Social Connections: Not on file   Interpersonal Safety: Not on file   Housing Stability: Not on file       ROS: A comprehensive Review of Systems was asked and answered in the negative unless specifically commented upon in the HPI    Physical Exam  BP (!) 132/99   Pulse 65   Ht 1.829 m (6')   Wt 97.5 kg (215 lb)   SpO2 96%   BMI 29.16 kg/m    Body mass index is 29.16 kg/m .  Gen: A&Ox3, NAD  HEENT: Moist mucus membranes, no scleral icterus.  Lungs: no respiratory distress  Abd: soft, non-tender, non-distended.  No guarding/rigidity/rebound.  Skin: no jaundice, no stigmata of chronic liver disease  Ext: warm, dry, no evidence of edema    LABS:  Admission on 2023, Discharged on 2023   Component Date Value Ref Range Status    Upper GI Endoscopy 2023    Final                    Value:M [address] [place] [address] [phone]     Endoscopy Department  _______________________________________________________________________________  Patient Name: Danny Lofton        Procedure Date: 2023 1:14 PM  MRN: [id number]                       Account Number: [id number]  YOB: 1970                Admit Type: Outpatient  Age: 53                               Room:  OR   Gender: Male                          Note Status: Finalized  Attending MD: GURU KRISTOFER SALINAS , ,   Total Sedation Time:   _______________________________________________________________________________     Procedure:             Upper GI endoscopy  Indications:           Walled off necrosis post endoscopic transluminal                          drainage                         53 y.o. male with a pertinent past medical history                          including HTN, Hyperlipidemia,acute pancreatitis                                                    unknown etiology (not alcohol) complicated by walled                         off necrosis. EUS under fluroscopy for endoscopic                          transluminal drainage was performed on 12/6. EGD under                          fluroscopy for cystdu  Providers:             GURU KRISTOFER SALINAS, Thaddeus Grant,                          LARRY  Referring MD:          DAISY STAPLETON  Medicines:             General Anesthesia, Levaquin 500 mg IV  Complications:         No immediate complications.  _______________________________________________________________________________  Procedure:             Pre-Anesthesia Assessment:                         - Prior to the procedure, a History and Physical was                          performed, and patient medications and allergies were                          reviewed. The patient is competent. The risks and                          benefits of the procedure and the sedation options and                          risks were                           discussed with the patient. All questions                          were answered and informed consent was obtained.                          Patient identification and proposed procedure were                          verified by the physician and the nurse in the                           pre-procedure area. Mental Status Examination: alert                          and oriented. Airway Examination: normal oropharyngeal                          airway and neck mobility. Respiratory Examination:                          clear to auscultation. CV Examination: normal.                          Prophylactic Antibiotics: The patient requires                          prophylactic antibiotics. Prior Anticoagulants: The                          patient has taken no anticoagulant or antiplatelet                          agents. ASA Grade Assessment: III - A patient with                          severe systemic disease. After reviewing the risks and                          benefits, the patient wa                          s deemed in satisfactory                          condition to undergo the procedure. The anesthesia                          plan was to use general anesthesia. Immediately prior                          to administration of medications, the patient was                          re-assessed for adequacy to receive sedatives. The                          heart rate, respiratory rate, oxygen saturations,                          blood pressure, adequacy of pulmonary ventilation, and                          response to care were monitored throughout the                          procedure. The physical status of the patient was                          re-assessed after the procedure.                         After obtaining informed consent, the endoscope was                          passed under direct vision. Throughout the procedure,                          the patient's blood pressure, pulse, and oxygen                          saturations were monitored continuously. The [id number]                                                    EGD was introduced through the mouth, and advanced to                          the second part of duodenum.                                                                                    Findings:       The examined esophagus was normal.       Extrinsic compression on the stomach made it a J-shaped stomach. The 1 T        single channel therapeutic gastroscope was exchanged to a 180 QF scope.       Two previously placed cystduodenostomy LAMS and coaxial double pigtailed        stents were seen in the second portion of the duodenum. STent appeared        to be occluded with large amount of necrosum which was debrided using a        rat-toothed forceps and stone extraction ballloon. Stent removal was        accomplished with a rat-toothed forceps. Contrast was injected to        delineate the necrotic cavity. Small residual cavity with minimal solid        material was seen.A 0.025 x 450 cm Visiglide wire was coiled in the        necrotic cavity. Two 7 Fr by 2 cm                           double pigtailed Zimmon stents were        left within the necrotic cavity                                                                                   Impression:            - Previously placed cystduodenostomy stents could be                          accessed for intervention only using the 180 QF scope                         - Occluded cystduodenostomy stents and endoscopic                          transluminal necrosectomy was performed in a                          methodical manner                         - Two 7 Fr by 2 cm double pigtailed Zimmon stents were                          left insitu due to concerns for disconnected                          pancreatic duct                         - Prcoedure was extremely complex and advanced                          (MODiFIER 22)  Recommendation:        - Observe patient in same day observation unit for                          ongoing care.                         - Likely will not require further endoscopic                                                    necrosectomy.Will recommend CECT in 4-6 weeks followed            "               by Pancreas Clinic Visit. May consider a secretin                          stimulated MRCP for evaluating disconnected pancreatic                          duct                         - Pt to watch for signs of infection such as fevers,                          chills and night sweats                         - Will consider removing the cystduodenostomy stents                          if there is no sign of disconnected pancreatic duct                         - The findings and recommendations were discussed with                          the patient.                                                                                     ____________________________________  GURU KRISTOFER LONG [facility],   12/20/2023 2:59:33 PM  Number of Addenda: 0    Note Initiated On: 12/20/2023 1:14 PM  Scope In:  Scope Out:            No results found for: \"NA\" No results found for: \"CHLORIDE\" No results found for: \"BUN\"   No results found for: \"POTASSIUM\" No results found for: \"CO2\" No results found for: \"CR\"     No results found for: \"WBC\", \"HGB\", \"HCT\", \"MCV\", \"PLT\"      IMAGING:  No results found for this or any previous visit.        Endoscopies:  No results found for this or any previous visit.   "

## 2024-02-12 DIAGNOSIS — Z12.11 SCREENING FOR COLON CANCER: Primary | ICD-10-CM

## 2024-02-20 ENCOUNTER — TELEPHONE (OUTPATIENT)
Dept: GASTROENTEROLOGY | Facility: CLINIC | Age: 54
End: 2024-02-20
Payer: COMMERCIAL

## 2024-02-20 NOTE — TELEPHONE ENCOUNTER
"Endoscopy Scheduling Screen    Have you had a positive Covid test in the last 14 days?  No    Are you active on MyChart?   Yes    What insurance is in the chart?  Other:  [employer]    Ordering/Referring Provider: Guru Edwige Christianson MD in [facility]    (If ordering provider performs procedure, schedule with ordering provider unless otherwise instructed. )    BMI: Estimated body mass index is 29.16 kg/m  as calculated from the following:    Height as of 2/8/24: 1.829 m (6').    Weight as of 2/8/24: 97.5 kg (215 lb).     Sedation Ordered  MAC/deep sedation.   BMI<= 45 45 < BMI <= 48 48 < BMI < = 50  BMI > 50   No Restrictions No [facility]  No [facility]  [facility] with exceptions Hospital Only OR Only       Are you taking any prescription medications for pain 3 or more times per week?   NO - No RN review required.    Do you have a history of malignant hyperthermia or adverse reaction to anesthesia?  Yes (Continue with scheduling. Nurse will notify anesthesia at scheduled location for eval.)     (Females) Are you currently pregnant?   No     Have you been diagnosed or told you have pulmonary hypertension?   No    Do you have an LVAD?  No    Have you been told you have moderate to severe sleep apnea?  No    Have you been told you have COPD, asthma, or any other lung disease?  No    Do you have any heart conditions?  No     Have you ever had an organ transplant?   No    Have you ever had or are you awaiting a heart or lung transplant?   No    Have you had a stroke or transient ischemic attack (TIA aka \"mini stroke\" in the last 6 months?   No    Have you been diagnosed with or been told you have cirrhosis of the liver?   No    Are you currently on dialysis?   No    Do you need assistance transferring?   No    BMI: Estimated body mass index is 29.16 kg/m  as calculated from the following:    Height as of 2/8/24: 1.829 m (6').    Weight as of 2/8/24: 97.5 kg (215 lb).     Is patients BMI > 40 and scheduling " location [facility]?  No    Do you take an injectable medication for weight loss or diabetes (excluding insulin)?  No    Do you take the medication Naltrexone?  No    Do you take blood thinners?  No       Prep   Are you currently on dialysis or do you have chronic kidney disease?  No    Do you have a diagnosis of diabetes?  No    Do you have a diagnosis of cystic fibrosis (CF)?  No    On a regular basis do you go 3 -5 days between bowel movements?  No    BMI > 40?  No    Preferred Pharmacy:    [facility] [address] [place] [id number]  Phone: [phone] Fax: [phone]      Final Scheduling Details   Colonoscopy prep sent?  N/A    Procedure scheduled  Colonoscopy    Surgeon:  Kimberli      Date of procedure:  04/16/2024     Pre-OP / PAC:   No - Not required for this site.    Location  [facility] - Patient preference.    Sedation   MAC/Deep Sedation - Per order.      Patient Reminders:   You will receive a call from a Nurse to review instructions and health history.  This assessment must be completed prior to your procedure.  Failure to complete the Nurse assessment may result in the procedure being cancelled.      On the day of your procedure, please designate an adult(s) who can drive you home stay with you for the next 24 hours. The medicines used in the exam will make you sleepy. You will not be able to drive.      You cannot take public transportation, ride share services, or non-medical taxi service without a responsible caregiver.  Medical transport services are allowed with the requirement that a responsible caregiver will receive you at your destination.  We require that drivers and caregivers are confirmed prior to your procedure.

## 2024-04-01 ENCOUNTER — TELEPHONE (OUTPATIENT)
Dept: GASTROENTEROLOGY | Facility: CLINIC | Age: 54
End: 2024-04-01
Payer: COMMERCIAL

## 2024-04-01 NOTE — TELEPHONE ENCOUNTER
Pre assessment completed for upcoming procedure.   (Please see previous telephone encounter notes for complete details)       Procedure details:    Arrival time and facility location reviewed.    Pre op exam needed? N/A    Designated  policy reviewed. Instructed to have someone stay 24  hours post procedure.       Medication review:    Medications reviewed. Please see supporting documentation below. Holding recommendations discussed (if applicable).       Prep for procedure:     Procedure prep instructions reviewed.        Any additional information needed:  N/A      Patient  verbalized understanding and had no questions or concerns at this time.      Nina Porter RN  Endoscopy Procedure Pre Assessment RN  [phone] option 4

## 2024-04-01 NOTE — TELEPHONE ENCOUNTER
Pre visit planning completed.      Procedure details:    Patient scheduled for Colonoscopy  on 4/16/2024.     Arrival time: 1100. Procedure time 1230    Facility location: [facility]; [address] [place] [address], [address] Floor, [place] [address]. Check in location: Main entrance at registration desk.    Sedation type: MAC    Pre op exam needed? N/A    Indication for procedure: Screening for colon cancer [Z12.11]       Chart review:     Electronic implanted devices? No    Recent diagnosis of diverticulitis within the last 6 weeks? No    Diabetic? No      Medication review:    Anticoagulants? No    NSAIDS? No NSAID medications per patient's medication list.  RN will verify with pre-assessment call.    Other medication HOLDING recommendations:  N/A      Prep for procedure:     Bowel prep recommendation: Standard Miralax  Due to: standard bowel prep.    Prep instructions sent via [employer]         Nina Porter RN  Endoscopy Procedure Pre Assessment RN  [phone] option 4

## 2024-04-03 ENCOUNTER — TELEPHONE (OUTPATIENT)
Dept: GASTROENTEROLOGY | Facility: CLINIC | Age: 54
End: 2024-04-03
Payer: COMMERCIAL

## 2024-04-03 NOTE — TELEPHONE ENCOUNTER
Caller:     Reason for Reschedule/Cancellation   (please be detailed, any staff messages or encounters to note?):       Prior to reschedule please review:  Ordering Provider:     GURU KRISTOFER SALINAS     Sedation Determined: MAC  Does patient have any ASC Exclusions, please identify?:       Notes on Cancelled Procedure:  Procedure: Lower Endoscopy [Colonoscopy]   Date: 4/16  Location: [facility]; [address] [place] [address]   Surgeon:       Rescheduled: Yes,   Procedure: Lower Endoscopy [Colonoscopy]    Date: 6/4   Location: [facility]; [address], [address] [place] [address]    Surgeon:    Sedation Level Scheduled  MAC ,  Reason for Sedation Level    Instructions updated and sent: Y     Does patient need PAC or Pre -Op Rescheduled? : N       Did you cancel or rescheduled an EUS procedure? No.

## 2024-04-19 ENCOUNTER — LAB REQUISITION (OUTPATIENT)
Dept: LAB | Facility: CLINIC | Age: 54
End: 2024-04-19

## 2024-04-19 LAB
FASTING STATUS PATIENT QL REPORTED: ABNORMAL
GLUCOSE SERPL-MCNC: 106 MG/DL (ref 70–99)
HBA1C MFR BLD: 5.9 %

## 2024-04-19 PROCEDURE — 82947 ASSAY GLUCOSE BLOOD QUANT: CPT | Performed by: PEDIATRICS

## 2024-04-19 PROCEDURE — 83036 HEMOGLOBIN GLYCOSYLATED A1C: CPT | Performed by: PEDIATRICS

## 2024-05-21 NOTE — TELEPHONE ENCOUNTER
Pre assessment completed for upcoming procedure.   (Please see previous telephone encounter notes for complete details)      Procedure details:    Arrival time and facility location reviewed.    Pre op exam needed? N/A    Designated  policy reviewed. Instructed to have someone stay 24  hours post procedure.       Medication review:    Medications reviewed. Please see supporting documentation below. Holding recommendations discussed (if applicable).       Prep for procedure:     Procedure prep instructions reviewed.        Any additional information needed:  N/A      Patient  verbalized understanding and had no questions or concerns at this time.      Nina Porter RN  Endoscopy Procedure Pre Assessment RN  [phone] option 4

## 2024-05-21 NOTE — TELEPHONE ENCOUNTER
Reschedule:    Pre visit planning completed.      Procedure details:    Patient scheduled for Colonoscopy  on 6/4/2024.     Arrival time: 0730. Procedure time 0900    Facility location: [facility]; [address] [place] [address], [address] Floor, [place] [address]. Check in location: Main entrance at registration desk.    Sedation type: MAC    Pre op exam needed? N/A    Indication for procedure: Screening for colon cancer [Z12.11]       Chart review:     Electronic implanted devices? No    Recent diagnosis of diverticulitis within the last 6 weeks? No    Diabetic? No      Medication review:    Anticoagulants? No    NSAIDS? No NSAID medications per patient's medication list.  RN will verify with pre-assessment call.    Other medication HOLDING recommendations:  N/A      Prep for procedure:     Bowel prep recommendation: Standard Miralax  Due to: standard bowel prep.    Prep instructions sent via [employer]         Nina Porter RN  Endoscopy Procedure Pre Assessment RN  [phone] option 4

## 2024-06-04 ENCOUNTER — ANESTHESIA (OUTPATIENT)
Dept: GASTROENTEROLOGY | Facility: CLINIC | Age: 54
End: 2024-06-04
Payer: COMMERCIAL

## 2024-06-04 ENCOUNTER — HOSPITAL ENCOUNTER (OUTPATIENT)
Facility: CLINIC | Age: 54
Discharge: HOME | End: 2024-06-04
Attending: INTERNAL MEDICINE | Admitting: INTERNAL MEDICINE
Payer: COMMERCIAL

## 2024-06-04 ENCOUNTER — ANESTHESIA EVENT (OUTPATIENT)
Dept: GASTROENTEROLOGY | Facility: CLINIC | Age: 54
End: 2024-06-04
Payer: COMMERCIAL

## 2024-06-04 VITALS
RESPIRATION RATE: 16 BRPM | SYSTOLIC BLOOD PRESSURE: 131 MMHG | OXYGEN SATURATION: 98 % | DIASTOLIC BLOOD PRESSURE: 95 MMHG

## 2024-06-04 LAB — COLONOSCOPY: NORMAL

## 2024-06-04 PROCEDURE — 45385 COLONOSCOPY W/LESION REMOVAL: CPT | Performed by: ANESTHESIOLOGY

## 2024-06-04 PROCEDURE — 45385 COLONOSCOPY W/LESION REMOVAL: CPT | Mod: PT | Performed by: INTERNAL MEDICINE

## 2024-06-04 PROCEDURE — 370N000017 HC ANESTHESIA TECHNICAL FEE, PER MIN: Performed by: INTERNAL MEDICINE

## 2024-06-04 PROCEDURE — 250N000009 HC RX 250: Performed by: NURSE ANESTHETIST, CERTIFIED REGISTERED

## 2024-06-04 PROCEDURE — 258N000003 HC RX IP 258 OP 636: Performed by: NURSE ANESTHETIST, CERTIFIED REGISTERED

## 2024-06-04 PROCEDURE — 45385 COLONOSCOPY W/LESION REMOVAL: CPT | Performed by: NURSE ANESTHETIST, CERTIFIED REGISTERED

## 2024-06-04 PROCEDURE — 88305 TISSUE EXAM BY PATHOLOGIST: CPT | Mod: TC | Performed by: INTERNAL MEDICINE

## 2024-06-04 PROCEDURE — 88305 TISSUE EXAM BY PATHOLOGIST: CPT | Mod: 26 | Performed by: PATHOLOGY

## 2024-06-04 PROCEDURE — 250N000011 HC RX IP 250 OP 636: Performed by: NURSE ANESTHETIST, CERTIFIED REGISTERED

## 2024-06-04 RX ORDER — PROPOFOL 10 MG/ML
INJECTION, EMULSION INTRAVENOUS CONTINUOUS PRN
Status: DISCONTINUED | OUTPATIENT
Start: 2024-06-04 | End: 2024-06-04

## 2024-06-04 RX ORDER — SODIUM CHLORIDE, SODIUM LACTATE, POTASSIUM CHLORIDE, CALCIUM CHLORIDE 600; 310; 30; 20 MG/100ML; MG/100ML; MG/100ML; MG/100ML
INJECTION, SOLUTION INTRAVENOUS CONTINUOUS PRN
Status: DISCONTINUED | OUTPATIENT
Start: 2024-06-04 | End: 2024-06-04

## 2024-06-04 RX ORDER — ONDANSETRON 2 MG/ML
4 INJECTION INTRAMUSCULAR; INTRAVENOUS
Status: DISCONTINUED | OUTPATIENT
Start: 2024-06-04 | End: 2024-06-04 | Stop reason: HOSPADM

## 2024-06-04 RX ORDER — LIDOCAINE 40 MG/G
CREAM TOPICAL
Status: DISCONTINUED | OUTPATIENT
Start: 2024-06-04 | End: 2024-06-04 | Stop reason: HOSPADM

## 2024-06-04 RX ORDER — LIDOCAINE HYDROCHLORIDE 20 MG/ML
INJECTION, SOLUTION INFILTRATION; PERINEURAL PRN
Status: DISCONTINUED | OUTPATIENT
Start: 2024-06-04 | End: 2024-06-04

## 2024-06-04 RX ADMIN — SODIUM CHLORIDE, POTASSIUM CHLORIDE, SODIUM LACTATE AND CALCIUM CHLORIDE: 600; 310; 30; 20 INJECTION, SOLUTION INTRAVENOUS at 09:50

## 2024-06-04 RX ADMIN — PROPOFOL 125 MCG/KG/MIN: 10 INJECTION, EMULSION INTRAVENOUS at 09:52

## 2024-06-04 RX ADMIN — LIDOCAINE HYDROCHLORIDE 80 MG: 20 INJECTION, SOLUTION INFILTRATION; PERINEURAL at 09:51

## 2024-06-04 RX ADMIN — MIDAZOLAM 2 MG: 1 INJECTION INTRAMUSCULAR; INTRAVENOUS at 09:54

## 2024-06-04 ASSESSMENT — ACTIVITIES OF DAILY LIVING (ADL)
ADLS_ACUITY_SCORE: 35

## 2024-06-04 NOTE — ANESTHESIA PREPROCEDURE EVALUATION
"Anesthesia Pre-Procedure Evaluation    Patient: Danny Lofton   MRN: [id number] : 1970        Procedure : Procedure(s):  Colonoscopy          Past Medical History:   Diagnosis Date    PONV (postoperative nausea and vomiting)       Past Surgical History:   Procedure Laterality Date    ENDOSCOPIC ULTRASOUND UPPER GASTROINTESTINAL TRACT (GI) N/A 2023    Procedure: ENDOSCOPIC ULTRASOUND guided cystoduodenostomy under fluoroscopy for endoscopic drainage. Dilation and stent placement;  Surgeon: Guru Edwige Christianson MD;  Location: [facility]    ESOPHAGOSCOPY, GASTROSCOPY, DUODENOSCOPY (EGD), COMBINED N/A 2023    Procedure: ESOPHAGOGASTRODUODENOSCOPY with cystduodenostomy stent exchange, necrosectomy;  Surgeon: Guru Edwige Christianson MD;  [facility]      No Known Allergies   Social History     Tobacco Use    Smoking status: Former     Current packs/day: 0.00     Types: Cigarettes     Quit date: 1997     Years since quittin.5    Smokeless tobacco: Never   Substance Use Topics    Alcohol use: Not Currently     Comment: none  since september      Wt Readings from Last 1 Encounters:   24 97.5 kg (215 lb)           Physical Exam    Airway             Respiratory Devices and Support         Dental       (+) Minor Abnormalities - some fillings, tiny chips      Cardiovascular             Pulmonary                   OUTSIDE LABS:  CBC: No results found for: \"WBC\", \"HGB\", \"HCT\", \"PLT\"  BMP:   Lab Results   Component Value Date     (H) 2024     COAGS: No results found for: \"PTT\", \"INR\", \"FIBR\"  POC: No results found for: \"BGM\", \"HCG\", \"HCGS\"  HEPATIC: No results found for: \"ALBUMIN\", \"PROTTOTAL\", \"ALT\", \"AST\", \"GGT\", \"ALKPHOS\", \"BILITOTAL\", \"BILIDIRECT\", \"DAVID\"  OTHER:   Lab Results   Component Value Date    A1C 5.9 (H) 2024       Anesthesia Plan    ASA Status:  2       Anesthesia Type: MAC.              Consents    Anesthesia Plan(s) and " associated risks, benefits, and realistic alternatives discussed. Questions answered and patient/representative(s) expressed understanding.     - Discussed:     - Discussed with:  Patient            Postoperative Care    Pain management: Multi-modal analgesia.        Comments:         HTN, HLD, hx pancreatitis.      LILI RUBIN MD    I have reviewed the pertinent notes and labs in the chart from the past 30 days and (re)examined the patient.  Any updates or changes from those notes are reflected in this note.

## 2024-06-04 NOTE — ANESTHESIA CARE TRANSFER NOTE
Patient: Danny Lofton    Procedure: Procedure(s):  COLONOSCOPY, FLEXIBLE, WITH LESION REMOVAL USING SNARE       Diagnosis: Screening for colon cancer [Z12.11]  Diagnosis Additional Information: No value filed.    Anesthesia Type:   MAC     Note:    Oropharynx: oropharynx clear of all foreign objects  Level of Consciousness: drowsy  Oxygen Supplementation: room air    Independent Airway: airway patency satisfactory and stable  Dentition: dentition unchanged  Vital Signs Stable: post-procedure vital signs reviewed and stable  Report to RN Given: handoff report given  Patient transferred to: PACU    Handoff Report: Identifed the Patient, Identified the Reponsible Provider, Reviewed the pertinent medical history, Discussed the surgical course, Reviewed Intra-OP anesthesia mangement and issues during anesthesia, Set expectations for post-procedure period and Allowed opportunity for questions and acknowledgement of understanding      Vitals:  Vitals Value Taken Time   /85 06/04/24 1024   Temp     Pulse     Resp     SpO2 95 % 06/04/24 1025   Vitals shown include unfiled device data.    Electronically Signed By: REJI Traylor CRNA  June 4, 2024  10:26 AM

## 2024-06-04 NOTE — ANESTHESIA POSTPROCEDURE EVALUATION
Patient: Danny Lofton    Procedure: Procedure(s):  COLONOSCOPY, FLEXIBLE, WITH LESION REMOVAL USING SNARE       Anesthesia Type:  MAC    Note:  Disposition: Outpatient   Postop Pain Control: Uneventful            Sign Out: Well controlled pain   PONV: No   Neuro/Psych: Uneventful            Sign Out: Acceptable/Baseline neuro status   Airway/Respiratory: Uneventful            Sign Out: Acceptable/Baseline resp. status   CV/Hemodynamics: Uneventful            Sign Out: Acceptable CV status; No obvious hypovolemia; No obvious fluid overload   Other NRE: NONE   DID A NON-ROUTINE EVENT OCCUR? No           Last vitals:  Vitals Value Taken Time   /95 06/04/24 1050   Temp     Pulse     Resp 16 06/04/24 1026   SpO2 98 % 06/04/24 1050       Electronically Signed By: LILI RUBIN MD  June 4, 2024  11:56 AM

## 2024-06-05 LAB
PATH REPORT.COMMENTS IMP SPEC: NORMAL
PATH REPORT.COMMENTS IMP SPEC: NORMAL
PATH REPORT.FINAL DX SPEC: NORMAL
PATH REPORT.GROSS SPEC: NORMAL
PATH REPORT.MICROSCOPIC SPEC OTHER STN: NORMAL
PATH REPORT.RELEVANT HX SPEC: NORMAL
PHOTO IMAGE: NORMAL

## 2024-06-05 NOTE — RESULT ENCOUNTER NOTE
"I am writing to let you know the results of the polyp that was removed at the time of your colonoscopy.  The polyp returned as an \"adenomatous polyp\".  An adenoma is considered a pre-cancerous polyp.  There was no cancer in your polyp.  Your polyp was completely removed, however you are at risk to grow more adenomatous polyps in the future.      Because of this I recommend that you repeat a colonoscopy to screen for new polyps in seven (7) years.  Of course should you develop any symptoms (such as unintended weight loss, blood in your stool or change in bowel pattern), you should let myself or your primary care doctor know as you may need an earlier procedure.      Finally, please make sure to let any first degree family members know that you had an adenomatous polyp as it may affect when they have a colonoscopy.  If you have any questions, please don't hesitate to contact the Gastroenterology nurse at     Guru Christianson    of Medicine  Division of Gastroenterology and Hepatology  Pancreatic Biliary Section  [facility]  "

## 2025-01-19 ENCOUNTER — HEALTH MAINTENANCE LETTER (OUTPATIENT)
Age: 55
End: 2025-01-19

## 2025-03-27 DIAGNOSIS — Z00.6 EXAMINATION OF PARTICIPANT OR CONTROL IN CLINICAL RESEARCH: Primary | ICD-10-CM

## 2025-05-30 ENCOUNTER — ANCILLARY PROCEDURE (OUTPATIENT)
Dept: MRI IMAGING | Facility: CLINIC | Age: 55
End: 2025-05-30
Attending: PEDIATRICS

## 2025-05-30 ENCOUNTER — LAB REQUISITION (OUTPATIENT)
Dept: LAB | Facility: CLINIC | Age: 55
End: 2025-05-30

## 2025-05-30 ENCOUNTER — RESULTS FOLLOW-UP (OUTPATIENT)
Dept: ENDOCRINOLOGY | Facility: CLINIC | Age: 55
End: 2025-05-30

## 2025-05-30 VITALS — BODY MASS INDEX: 32.89 KG/M2 | WEIGHT: 242.51 LBS

## 2025-05-30 DIAGNOSIS — Z00.6 EXAMINATION OF PARTICIPANT OR CONTROL IN CLINICAL RESEARCH: ICD-10-CM

## 2025-05-30 LAB
EST. AVERAGE GLUCOSE BLD GHB EST-MCNC: 143 MG/DL
GLUCOSE SERPL-MCNC: 116 MG/DL (ref 70–99)
HBA1C MFR BLD: 6.6 %

## 2025-05-30 PROCEDURE — 74183 MRI ABD W/O CNTR FLWD CNTR: CPT | Mod: GC | Performed by: STUDENT IN AN ORGANIZED HEALTH CARE EDUCATION/TRAINING PROGRAM

## 2025-05-30 PROCEDURE — 83036 HEMOGLOBIN GLYCOSYLATED A1C: CPT | Performed by: PEDIATRICS

## 2025-05-30 PROCEDURE — 82947 ASSAY GLUCOSE BLOOD QUANT: CPT | Performed by: PEDIATRICS

## 2025-05-30 PROCEDURE — A9585 GADOBUTROL INJECTION: HCPCS | Mod: JZ | Performed by: STUDENT IN AN ORGANIZED HEALTH CARE EDUCATION/TRAINING PROGRAM

## 2025-05-30 RX ORDER — GADOBUTROL 604.72 MG/ML
10 INJECTION INTRAVENOUS ONCE
Status: COMPLETED | OUTPATIENT
Start: 2025-05-30 | End: 2025-05-30

## 2025-05-30 RX ADMIN — GADOBUTROL 10 ML: 604.72 INJECTION INTRAVENOUS at 11:57

## 2025-06-02 ENCOUNTER — CARE COORDINATION (OUTPATIENT)
Dept: GASTROENTEROLOGY | Facility: CLINIC | Age: 55
End: 2025-06-02
Payer: COMMERCIAL

## 2025-06-02 DIAGNOSIS — R93.3 IMAGING OF GASTROINTESTINAL TRACT ABNORMAL: Primary | ICD-10-CM

## 2025-06-02 NOTE — PROGRESS NOTES
Review of recent imaging requested by Dr. Perdomo.     MRI ABDOMEN 5/30/25:  CLINICAL HISTORY:    Examination of participant or control in clinical  Research  Pancreas: Lobulated cystic structure at the pancreas measuring 1.5 cm.  No suspicious enhancement. No ductal dilatation..  IMPRESSION:  1. Status post cholecystectomy with a fluid collection in the  gallbladder fossa, most likely representing resolving postsurgical  seroma/hematoma. No convincing evidence of biliary leak, however if  there is clinical concern for biliary leak a nuclear medicine  hepatobiliary scan or MRI with Eovist could be obtained.  2. Hepatic steatosis.  3. Lobulated cystic structure of the pancreas is likely an IPMN. No  suspicious features. Follow-up according to guidelines below..     Following review of imaging, per Dr. Christianson: EUS with FNA as this may be a pseudocyst rather than IPMN.   Please schedule this with me in [facility] under MAC next available. Thanks.    Called patient to discuss procedure and indication. Discussed that procedure would be done at [facility] in [place] and that patient would require a  that day.     Patient agreeable to proceed with recommendations. Gastro procedure order placed. Patient advised that they should hear from endoscopy scheduling team in the coming days.    Provided with clinic contact information for further questions or concerns.     Brianda Miller, RN Care Coordinator

## 2025-06-11 ENCOUNTER — TELEPHONE (OUTPATIENT)
Dept: GASTROENTEROLOGY | Facility: CLINIC | Age: 55
End: 2025-06-11
Payer: COMMERCIAL

## 2025-06-11 NOTE — TELEPHONE ENCOUNTER
Pre visit planning completed.    Procedure details:    Patient scheduled for Endoscopic ultrasound (EUS) on 06/17/2025.     Arrival time: 0800. Procedure time 0930    Facility location: [facility]; [address] [place] [address], [address] Floor, [place] [address]. Check in location: Main entrance at registration desk.  *Disclaimer: Drivers are to check in with patient and stay on campus during procedure.     Sedation type: MAC    Pre op exam needed? No.    Indication for procedure:   R93.3 (ICD-10-CM) - Imaging of gastrointestinal tract abnormal      Chart review:     Electronic implanted devices? No    Recent diagnosis of diverticulitis within the last 6 weeks? No    Medication review:    Diabetic? No    Anticoagulants? No    Weight loss medication/injectable? No GLP-1 medication per patient's medication list. Nursing to verify with pre-assessment call.    Other medication HOLDING recommendations:  N/A    Prep for procedure:     Bowel prep recommendation: N/A  Due to: EUS    Procedure information and instructions sent via [facility]       Kiley Galindo RN  Endoscopy Procedure Pre Assessment   [phone] option 3

## 2025-06-11 NOTE — TELEPHONE ENCOUNTER
"Endoscopy Scheduling Screen    Caller: patient    Have you had any respiratory illness or flu-like symptoms in the last 10 days?  No    What is your communication preference for Instructions and/or Bowel Prep?   MyChart    What insurance is in the chart?  Other:  BCBS    Ordering/Referring Provider: RITA   (If ordering provider performs procedure, schedule with ordering provider unless otherwise instructed. )    BMI: Estimated body mass index is 32.89 kg/m  as calculated from the following:    Height as of 2/8/24: 1.829 m (6').    Weight as of 5/30/25: 110 kg (242 lb 8.1 oz).     Sedation Ordered  MAC/deep sedation.   BMI<= 45 45 < BMI <= 48 48 < BMI < = 50  BMI > 50   No Restrictions No [facility]  No [facility]  [facility] with exceptions Hospital Only OR Only         Do you have a history of malignant hyperthermia?  No      (Females) Are you currently pregnant?        Have you been diagnosed or told you have pulmonary hypertension?   No      Do you have an LVAD?  No      Have you been told you have moderate to severe sleep apnea?  No.      Have you been told you have COPD, asthma, or any other lung disease?  No      Has your doctor ordered any cardiac tests like echo, angiogram, stress test, ablation, or EKG, that you have not completed yet?  No      Do you  have a history of any heart conditions?  No       Have you ever had or are you waiting for an organ transplant?  No. Continue scheduling, no site restrictions.      Have you had a stroke or transient ischemic attack (TIA aka \"mini stroke\") in the last 2 years?   No.      Have you been diagnosed with or been told you have cirrhosis of the liver?   No.      Are you currently on dialysis?   No      Do you need assistance transferring?   No    BMI: Estimated body mass index is 32.89 kg/m  as calculated from the following:    Height as of 2/8/24: 1.829 m (6').    Weight as of 5/30/25: 110 kg (242 lb 8.1 oz).     Is patients BMI > 40 and scheduling location " [facility]?  No    Do you take an injectable or oral medication for weight loss or diabetes (excluding insulin)?  No    Do you take the medication Naltrexone?  No    Do you take blood thinners?  No       Prep   Are you currently on dialysis or do you have chronic kidney disease?  No    Do you have a diagnosis of diabetes?  No    Do you have a diagnosis of cystic fibrosis (CF)?  No    On a regular basis do you go 3 -5 days between bowel movements?  No    BMI > 40?  No    Preferred Pharmacy:    [email or web address] [address] [place] [id number]  Phone: [phone] Fax: [phone]      Final Scheduling Details     Procedure scheduled  Endoscopic ultrasound (EUS)    Surgeon:  RITA     Date of procedure:  6/17/25     Pre-OP / PAC:   No - Not required for this site.    Location  [facility] - Per order.    Sedation   MAC/Deep Sedation - Per order.      Patient Reminders:   You will receive a call from a Nurse to review instructions and health history.  This assessment must be completed prior to your procedure.  Failure to complete the Nurse assessment may result in the procedure being cancelled.      On the day of your procedure, please designate an adult(s) who can drive you home stay with you for the next 24 hours. The medicines used in the exam will make you sleepy. You will not be able to drive.      You cannot take public transportation, ride share services, or non-medical taxi service without a responsible caregiver.  Medical transport services are allowed with the requirement that a responsible caregiver will receive you at your destination.  We require that drivers and caregivers are confirmed prior to your procedure.

## 2025-06-11 NOTE — TELEPHONE ENCOUNTER
Pre assessment completed for upcoming procedure.   (Please see previous telephone encounter notes for complete details)    Procedure details:    Procedure date 06/17/2025, arrival time 0800 and facility location reviewed.    Pre op exam needed? No.    Designated  policy reviewed and that site requests drivers to check in and stay on campus. Instructed to have someone stay 24  hours post procedure.   *Disclaimer - please notify the [facility] Op Manager with any  issues/concerns.    Medication review:    Medications reviewed. Please see supporting documentation below. Holding recommendations discussed (if applicable).       Prep for procedure:    Procedure prep instructions reviewed.        Any additional information needed:  N/A      Patient verbalized understanding and had no questions or concerns at this time.      Kiley Galindo RN  Endoscopy Procedure Pre Assessment   [phone] option 3

## 2025-06-17 ENCOUNTER — ANESTHESIA EVENT (OUTPATIENT)
Dept: GASTROENTEROLOGY | Facility: CLINIC | Age: 55
End: 2025-06-17
Payer: COMMERCIAL

## 2025-06-17 ENCOUNTER — ANESTHESIA (OUTPATIENT)
Dept: GASTROENTEROLOGY | Facility: CLINIC | Age: 55
End: 2025-06-17
Payer: COMMERCIAL

## 2025-06-17 ENCOUNTER — HOSPITAL ENCOUNTER (OUTPATIENT)
Facility: CLINIC | Age: 55
Discharge: HOME | End: 2025-06-17
Attending: INTERNAL MEDICINE | Admitting: INTERNAL MEDICINE
Payer: COMMERCIAL

## 2025-06-17 VITALS
RESPIRATION RATE: 18 BRPM | SYSTOLIC BLOOD PRESSURE: 109 MMHG | OXYGEN SATURATION: 94 % | HEART RATE: 68 BPM | DIASTOLIC BLOOD PRESSURE: 81 MMHG | TEMPERATURE: 98.1 F

## 2025-06-17 DIAGNOSIS — Z98.890 S/P FINE NEEDLE ASPIRATION: Primary | ICD-10-CM

## 2025-06-17 LAB — UPPER EUS: NORMAL

## 2025-06-17 PROCEDURE — 258N000003 HC RX IP 258 OP 636: Performed by: NURSE ANESTHETIST, CERTIFIED REGISTERED

## 2025-06-17 PROCEDURE — 250N000009 HC RX 250: Performed by: NURSE ANESTHETIST, CERTIFIED REGISTERED

## 2025-06-17 PROCEDURE — 250N000011 HC RX IP 250 OP 636: Performed by: NURSE ANESTHETIST, CERTIFIED REGISTERED

## 2025-06-17 PROCEDURE — 43238 EGD US FINE NEEDLE BX/ASPIR: CPT | Performed by: INTERNAL MEDICINE

## 2025-06-17 PROCEDURE — 88313 SPECIAL STAINS GROUP 2: CPT | Mod: 26 | Performed by: PATHOLOGY

## 2025-06-17 PROCEDURE — 88108 CYTOPATH CONCENTRATE TECH: CPT | Mod: 26 | Performed by: PATHOLOGY

## 2025-06-17 PROCEDURE — 88108 CYTOPATH CONCENTRATE TECH: CPT | Mod: TC | Performed by: INTERNAL MEDICINE

## 2025-06-17 PROCEDURE — 370N000017 HC ANESTHESIA TECHNICAL FEE, PER MIN: Performed by: INTERNAL MEDICINE

## 2025-06-17 PROCEDURE — 43242 EGD US FINE NEEDLE BX/ASPIR: CPT | Performed by: INTERNAL MEDICINE

## 2025-06-17 RX ORDER — ONDANSETRON 2 MG/ML
4 INJECTION INTRAMUSCULAR; INTRAVENOUS EVERY 30 MIN PRN
Status: DISCONTINUED | OUTPATIENT
Start: 2025-06-17 | End: 2025-06-17 | Stop reason: HOSPADM

## 2025-06-17 RX ORDER — ONDANSETRON 4 MG/1
4 TABLET, ORALLY DISINTEGRATING ORAL EVERY 30 MIN PRN
Status: DISCONTINUED | OUTPATIENT
Start: 2025-06-17 | End: 2025-06-17 | Stop reason: HOSPADM

## 2025-06-17 RX ORDER — LEVOFLOXACIN 500 MG/1
500 TABLET, FILM COATED ORAL DAILY
Qty: 5 TABLET | Refills: 0 | Status: SHIPPED | OUTPATIENT
Start: 2025-06-18

## 2025-06-17 RX ORDER — LEVOFLOXACIN 5 MG/ML
INJECTION, SOLUTION INTRAVENOUS PRN
Status: DISCONTINUED | OUTPATIENT
Start: 2025-06-17 | End: 2025-06-17

## 2025-06-17 RX ORDER — FENTANYL CITRATE 50 UG/ML
25 INJECTION, SOLUTION INTRAMUSCULAR; INTRAVENOUS EVERY 5 MIN PRN
Status: DISCONTINUED | OUTPATIENT
Start: 2025-06-17 | End: 2025-06-17 | Stop reason: HOSPADM

## 2025-06-17 RX ORDER — FENTANYL CITRATE 50 UG/ML
50 INJECTION, SOLUTION INTRAMUSCULAR; INTRAVENOUS EVERY 5 MIN PRN
Status: DISCONTINUED | OUTPATIENT
Start: 2025-06-17 | End: 2025-06-17 | Stop reason: HOSPADM

## 2025-06-17 RX ORDER — PROPOFOL 10 MG/ML
INJECTION, EMULSION INTRAVENOUS CONTINUOUS PRN
Status: DISCONTINUED | OUTPATIENT
Start: 2025-06-17 | End: 2025-06-17

## 2025-06-17 RX ORDER — NALOXONE HYDROCHLORIDE 0.4 MG/ML
0.1 INJECTION, SOLUTION INTRAMUSCULAR; INTRAVENOUS; SUBCUTANEOUS
Status: DISCONTINUED | OUTPATIENT
Start: 2025-06-17 | End: 2025-06-17 | Stop reason: HOSPADM

## 2025-06-17 RX ORDER — SODIUM CHLORIDE, SODIUM LACTATE, POTASSIUM CHLORIDE, CALCIUM CHLORIDE 600; 310; 30; 20 MG/100ML; MG/100ML; MG/100ML; MG/100ML
INJECTION, SOLUTION INTRAVENOUS CONTINUOUS
Status: DISCONTINUED | OUTPATIENT
Start: 2025-06-17 | End: 2025-06-17 | Stop reason: HOSPADM

## 2025-06-17 RX ORDER — HYDROMORPHONE HCL IN WATER/PF 6 MG/30 ML
0.2 PATIENT CONTROLLED ANALGESIA SYRINGE INTRAVENOUS EVERY 5 MIN PRN
Status: DISCONTINUED | OUTPATIENT
Start: 2025-06-17 | End: 2025-06-17 | Stop reason: HOSPADM

## 2025-06-17 RX ORDER — PROPOFOL 10 MG/ML
INJECTION, EMULSION INTRAVENOUS PRN
Status: DISCONTINUED | OUTPATIENT
Start: 2025-06-17 | End: 2025-06-17

## 2025-06-17 RX ORDER — HYDROMORPHONE HCL IN WATER/PF 6 MG/30 ML
0.4 PATIENT CONTROLLED ANALGESIA SYRINGE INTRAVENOUS EVERY 5 MIN PRN
Status: DISCONTINUED | OUTPATIENT
Start: 2025-06-17 | End: 2025-06-17 | Stop reason: HOSPADM

## 2025-06-17 RX ORDER — DEXAMETHASONE SODIUM PHOSPHATE 4 MG/ML
4 INJECTION, SOLUTION INTRA-ARTICULAR; INTRALESIONAL; INTRAMUSCULAR; INTRAVENOUS; SOFT TISSUE
Status: DISCONTINUED | OUTPATIENT
Start: 2025-06-17 | End: 2025-06-17 | Stop reason: HOSPADM

## 2025-06-17 RX ORDER — SODIUM CHLORIDE, SODIUM LACTATE, POTASSIUM CHLORIDE, CALCIUM CHLORIDE 600; 310; 30; 20 MG/100ML; MG/100ML; MG/100ML; MG/100ML
INJECTION, SOLUTION INTRAVENOUS CONTINUOUS PRN
Status: DISCONTINUED | OUTPATIENT
Start: 2025-06-17 | End: 2025-06-17

## 2025-06-17 RX ORDER — LIDOCAINE HYDROCHLORIDE 20 MG/ML
INJECTION, SOLUTION INFILTRATION; PERINEURAL PRN
Status: DISCONTINUED | OUTPATIENT
Start: 2025-06-17 | End: 2025-06-17

## 2025-06-17 RX ADMIN — PROPOFOL 50 MG: 10 INJECTION, EMULSION INTRAVENOUS at 09:53

## 2025-06-17 RX ADMIN — TOPICAL ANESTHETIC 1 SPRAY: 200 SPRAY DENTAL; PERIODONTAL at 09:43

## 2025-06-17 RX ADMIN — PROPOFOL 150 MCG/KG/MIN: 10 INJECTION, EMULSION INTRAVENOUS at 09:47

## 2025-06-17 RX ADMIN — LIDOCAINE HYDROCHLORIDE 20 MG: 20 INJECTION, SOLUTION INFILTRATION; PERINEURAL at 09:53

## 2025-06-17 RX ADMIN — DEXMEDETOMIDINE HYDROCHLORIDE 12 MCG: 100 INJECTION, SOLUTION INTRAVENOUS at 09:43

## 2025-06-17 RX ADMIN — LIDOCAINE HYDROCHLORIDE 80 MG: 20 INJECTION, SOLUTION INFILTRATION; PERINEURAL at 09:43

## 2025-06-17 RX ADMIN — LEVOFLOXACIN 500 MG: 5 INJECTION, SOLUTION INTRAVENOUS at 10:02

## 2025-06-17 RX ADMIN — PROPOFOL 50 MG: 10 INJECTION, EMULSION INTRAVENOUS at 09:49

## 2025-06-17 RX ADMIN — SODIUM CHLORIDE, SODIUM LACTATE, POTASSIUM CHLORIDE, AND CALCIUM CHLORIDE: .6; .31; .03; .02 INJECTION, SOLUTION INTRAVENOUS at 09:43

## 2025-06-17 ASSESSMENT — ACTIVITIES OF DAILY LIVING (ADL)
ADLS_ACUITY_SCORE: 41

## 2025-06-17 NOTE — ANESTHESIA CARE TRANSFER NOTE
Patient: Danny Lofton    Procedure: Procedure(s):  ESOPHAGOGASTRODUODENOSCOPY, WITH FINE NEEDLE ASPIRATION PANCREAS CYST DRAINAGE, WITH ENDOSCOPIC ULTRASOUND GUIDANCE       Diagnosis: Imaging of gastrointestinal tract abnormal [R93.3]  Diagnosis Additional Information: No value filed.    Anesthesia Type:   MAC     Note:    Oropharynx: oropharynx clear of all foreign objects and spontaneously breathing  Level of Consciousness: drowsy  Oxygen Supplementation: face mask  Level of Supplemental Oxygen (L/min / FiO2): 6  Independent Airway: airway patency satisfactory and stable  Dentition: dentition unchanged  Vital Signs Stable: post-procedure vital signs reviewed and stable  Report to RN Given: handoff report given  Patient transferred to: Phase II    Handoff Report: Identifed the Patient, Identified the Reponsible Provider, Reviewed the pertinent medical history, Discussed the surgical course, Reviewed Intra-OP anesthesia mangement and issues during anesthesia, Set expectations for post-procedure period and Allowed opportunity for questions and acknowledgement of understanding      Vitals:  Vitals Value Taken Time   /79 06/17/25 10:16   Temp 36    Pulse 65    Resp 12    SpO2 97 % 06/17/25 10:16   Vitals shown include unfiled device data.    Electronically Signed By: REJI Betancourt CRNA  June 17, 2025  10:16 AM

## 2025-06-17 NOTE — ANESTHESIA PREPROCEDURE EVALUATION
Anesthesia Pre-Procedure Evaluation    Patient: Danny Lofton   MRN: [id number] : 1970          Procedure : Procedure(s):  Endoscopic ultrasound upper gastrointestinal tract (GI)         Past Medical History:   Diagnosis Date    PONV (postoperative nausea and vomiting)       Past Surgical History:   Procedure Laterality Date    COLONOSCOPY N/A 2024    Procedure: COLONOSCOPY, FLEXIBLE, WITH LESION REMOVAL USING SNARE;  Surgeon: Guru Edwige Christianson MD;  Location: [facility]    ENDOSCOPIC ULTRASOUND UPPER GASTROINTESTINAL TRACT (GI) N/A 2023    Procedure: ENDOSCOPIC ULTRASOUND guided cystoduodenostomy under fluoroscopy for endoscopic drainage. Dilation and stent placement;  Surgeon: Guru Edwige Christianson MD;  Location: [facility] OR    ESOPHAGOSCOPY, GASTROSCOPY, DUODENOSCOPY (EGD), COMBINED N/A 2023    Procedure: ESOPHAGOGASTRODUODENOSCOPY with cystduodenostomy stent exchange, necrosectomy;  Surgeon: Guru Edwige Christianson MD;  Location: [facility]      No Known Allergies   Social History     Tobacco Use    Smoking status: Former     Current packs/day: 0.00     Types: Cigarettes     Quit date: 1997     Years since quittin.5    Smokeless tobacco: Never   Substance Use Topics    Alcohol use: Not Currently     Comment: none  since september      Wt Readings from Last 1 Encounters:   25 110 kg (242 lb 8.1 oz)        Anesthesia Evaluation   Pt has had prior anesthetic. Type: General and MAC.    History of anesthetic complications  - .  Emergence delirium.    ROS/MED HX  ENT/Pulmonary:     (+)     LLOYD risk factors,                                   Neurologic:  - neg neurologic ROS     Cardiovascular:       METS/Exercise Tolerance:     Hematologic:  - neg hematologic  ROS     Musculoskeletal:  - neg musculoskeletal ROS     GI/Hepatic: Comment: Necrotizing pancreatitis  Hepatic steatosis      Renal/Genitourinary:  - neg Renal ROS     Endo:  - neg endo ROS  "    Psychiatric/Substance Use:  - neg psychiatric ROS     Infectious Disease:       Malignancy:  - neg malignancy ROS     Other:              Physical Exam  Airway  Mallampati: III  TM distance: >3 FB  Neck ROM: limited  Upper bite lip test: II    Cardiovascular - normal exam   Dental   (+) Multiple crowns, permanent bridges      Pulmonary - normal exam      Neurological   Other Findings       OUTSIDE LABS:  CBC: No results found for: \"WBC\", \"HGB\", \"HCT\", \"PLT\"  BMP:   Lab Results   Component Value Date     (H) 05/30/2025     (H) 04/19/2024     COAGS: No results found for: \"PTT\", \"INR\", \"FIBR\"  POC: No results found for: \"BGM\", \"HCG\", \"HCGS\"  HEPATIC: No results found for: \"ALBUMIN\", \"PROTTOTAL\", \"ALT\", \"AST\", \"GGT\", \"ALKPHOS\", \"BILITOTAL\", \"BILIDIRECT\", \"DAVID\"  OTHER:   Lab Results   Component Value Date    A1C 6.6 (H) 05/30/2025       Anesthesia Plan    ASA Status:  3      NPO Status: NPO Appropriate   Anesthesia Type: MAC.  Maintenance: TIVA.   Techniques and Equipment:       - Monitoring Plan: standard ASA monitoring     Consents    Anesthesia Plan(s) and associated risks, benefits, and realistic alternatives discussed. Questions answered and patient/representative(s) expressed understanding.     - Discussed: CRNA     - Discussed with:  Patient        - Pt is DNR/DNI Status: no DNR          Postoperative Care    Pain management: plan for postoperative opioid use.     Comments:                  PAC Discussion and Assessment    ASA Classification: 3    Anesthetic techniques and relevant risks discussed: MAC with GA as backup  Invasive monitoring and risk discussed: No    Possibility and Risk of blood transfusion discussed: No  NPO instructions given: NPO after midnight    Needs early admission to pre-op area: No                                            Cordelia Aceves MD    I have reviewed the pertinent notes and labs in the chart from the past 30 days and (re)examined the patient.  Any updates " or changes from those notes are reflected in this note.    Clinically Significant Risk Factors Present on Admission                            # DMII: A1C = 6.6 % (Ref range: <5.7 %) within past 6 months

## 2025-06-17 NOTE — ANESTHESIA POSTPROCEDURE EVALUATION
Patient: Danny Lofton    Procedure: Procedure(s):  ESOPHAGOGASTRODUODENOSCOPY, WITH FINE NEEDLE ASPIRATION PANCREAS CYST DRAINAGE, WITH ENDOSCOPIC ULTRASOUND GUIDANCE       Anesthesia Type:  MAC    Note:  Disposition: Outpatient   Postop Pain Control: Uneventful            Sign Out: Well controlled pain   PONV: No   Neuro/Psych: Uneventful            Sign Out: Acceptable/Baseline neuro status   Airway/Respiratory: Uneventful            Sign Out: Acceptable/Baseline resp. status   CV/Hemodynamics: Uneventful            Sign Out: Acceptable CV status; No obvious hypovolemia; No obvious fluid overload   Other NRE: NONE   DID A NON-ROUTINE EVENT OCCUR? No           Last vitals:  Vitals Value Taken Time   /81 06/17/25 10:40   Temp     Pulse     Resp 18 06/17/25 10:40   SpO2 93 % 06/17/25 10:45   Vitals shown include unfiled device data.    Electronically Signed By: Cordelia Aceves MD  June 17, 2025  2:40 PM

## 2025-06-18 LAB
PATH REPORT.COMMENTS IMP SPEC: ABNORMAL
PATH REPORT.COMMENTS IMP SPEC: YES
PATH REPORT.FINAL DX SPEC: ABNORMAL
PATH REPORT.GROSS SPEC: ABNORMAL
PATH REPORT.MICROSCOPIC SPEC OTHER STN: ABNORMAL
PATH REPORT.RELEVANT HX SPEC: ABNORMAL

## 2025-06-19 ENCOUNTER — RESULTS FOLLOW-UP (OUTPATIENT)
Dept: FAMILY MEDICINE | Facility: CLINIC | Age: 55
End: 2025-06-19

## 2025-07-02 ENCOUNTER — CARE COORDINATION (OUTPATIENT)
Dept: GASTROENTEROLOGY | Facility: CLINIC | Age: 55
End: 2025-07-02
Payer: COMMERCIAL

## 2025-07-02 DIAGNOSIS — K86.2 PANCREATIC CYST: Primary | ICD-10-CM

## 2025-07-02 NOTE — PROGRESS NOTES
Post Upper EUS on 6/17/25 with Dr. Christianson.      Follow-up recommendations:    - Await cyst fluid analysis results                          - If cyst fluid is suggestive of a mucinous process                          then MRI with IV gadolinium in 6 months followed by                          MRI every 18 months based on the size as per the [place]                          guidelines     Following review of cytology, per Dr. Christianson:   The results of the EUS guided FNA showed that cyst had both intra and extracellular mucin. Additionally the cyst fluid aspirated was too viscous and thick. Other analysis including cyst fluid glucose, CEA, amylase could not be performed. However based on the available information the cyst is likely a mucinous cyst. This is endosonographically most likely to represent a sidebranch IPMN. There are currently no worrisome features or features suggestive of malignant potential. We will recommend an MRI with IV gadolinium contrast in 6 months as per the most recent [place] consensus guidelines based on the size. We will schedule clinic visit to discuss the findings of the next MRI.    Orders placed:   MRI abdomen with contrast due December 2025.   Message to clinic coordinators to assist with scheduling.        Brianda Miller RN Care Coordinator

## 2025-07-07 ENCOUNTER — DOCUMENTATION ONLY (OUTPATIENT)
Dept: GASTROENTEROLOGY | Facility: CLINIC | Age: 55
End: 2025-07-07
Payer: COMMERCIAL

## (undated) DEVICE — SOL WATER IRRIG 1000ML BOTTLE 2F7114

## (undated) DEVICE — GLOVE BIOGEL PI ULTRATOUCH G SZ 7.5 42175

## (undated) DEVICE — PACK ENDOSCOPY GI CUSTOM UMMC

## (undated) DEVICE — SUCTION MANIFOLD NEPTUNE 2 SYS 4 PORT 0702-020-000

## (undated) DEVICE — ENDO TUBING CO2 SMARTCAP STERILE DISP 100145CO2EXT

## (undated) DEVICE — TUBING SUCTION 10'X3/16" N510

## (undated) DEVICE — ENDO FCP GRASPING ROTATABLE 7.2MMX2.6MM FG-244NR

## (undated) DEVICE — KIT ENDO FIRST STEP DISINFECTANT 200ML W/POUCH EP-4

## (undated) DEVICE — SPECIMEN CONTAINER 3OZ W/FORMALIN 59901

## (undated) DEVICE — ENDO BITE BLOCK ADULT OMNI-BLOC

## (undated) DEVICE — BALLOON ELATION 180CML 11-12-13.5-15-16MM 5.5CM EPX12

## (undated) DEVICE — ENDO PROBE COVER ULTRASOUND BALLOON LATEX  MAJ-249

## (undated) DEVICE — WIRE GUIDE 0.025"X450CM ANG VISIGLIDE G-240-2545A

## (undated) DEVICE — ENDO DEVICE LOCKING AND BIOPSY CAP M00545261

## (undated) DEVICE — ENDO SNARE POLYPECTOMY OVAL 15MM LOOP SD-240U-15

## (undated) DEVICE — ENDO CAP AND TUBING STERILE FOR ENDOGATOR  100130

## (undated) DEVICE — BIOPSY VALVE BIOSHIELD 00711135

## (undated) DEVICE — CATH RETRIEVAL BALLOON EXTRACTOR PRO RX-S INJ ABOVE 9-12MM

## (undated) DEVICE — SOL NACL 0.9% IRRIG 1000ML BOTTLE 2F7124

## (undated) DEVICE — INFLATION DEVICE BIG 60 ENDO-AN6012

## (undated) DEVICE — KIT CONNECTOR FOR OLYMPUS ENDOSCOPES DEFENDO 100310

## (undated) DEVICE — PAD CHUX UNDERPAD 23X24" 7136

## (undated) RX ORDER — LEVOFLOXACIN 5 MG/ML
INJECTION, SOLUTION INTRAVENOUS
Status: DISPENSED
Start: 2025-06-17

## (undated) RX ORDER — FENTANYL CITRATE 50 UG/ML
INJECTION, SOLUTION INTRAMUSCULAR; INTRAVENOUS
Status: DISPENSED
Start: 2023-12-20

## (undated) RX ORDER — PROPOFOL 10 MG/ML
INJECTION, EMULSION INTRAVENOUS
Status: DISPENSED
Start: 2024-06-04

## (undated) RX ORDER — FENTANYL CITRATE 50 UG/ML
INJECTION, SOLUTION INTRAMUSCULAR; INTRAVENOUS
Status: DISPENSED
Start: 2023-12-06

## (undated) RX ORDER — APREPITANT 40 MG/1
CAPSULE ORAL
Status: DISPENSED
Start: 2023-12-06